# Patient Record
Sex: FEMALE | Race: ASIAN | Employment: OTHER | URBAN - METROPOLITAN AREA
[De-identification: names, ages, dates, MRNs, and addresses within clinical notes are randomized per-mention and may not be internally consistent; named-entity substitution may affect disease eponyms.]

---

## 2017-01-11 ENCOUNTER — HOSPITAL ENCOUNTER (INPATIENT)
Facility: HOSPITAL | Age: 77
LOS: 2 days | Discharge: HOME | DRG: 305 | End: 2017-01-13
Attending: EMERGENCY MEDICINE | Admitting: HOSPITALIST
Payer: MEDICAID

## 2017-01-11 DIAGNOSIS — I16.1 HYPERTENSIVE EMERGENCY: Primary | ICD-10-CM

## 2017-01-11 DIAGNOSIS — E87.1 HYPONATREMIA: ICD-10-CM

## 2017-01-11 LAB
ALBUMIN SERPL-MCNC: 3.8 G/DL (ref 3.5–4.8)
ALP LIVER SERPL-CCNC: 82 U/L (ref 55–142)
ALT SERPL-CCNC: 65 U/L (ref 14–54)
AST SERPL-CCNC: 58 U/L (ref 15–41)
ATRIAL RATE: 60 BPM
BASOPHILS # BLD AUTO: 0.04 X10(3) UL (ref 0–0.1)
BASOPHILS NFR BLD AUTO: 0.5 %
BILIRUB SERPL-MCNC: 0.7 MG/DL (ref 0.1–2)
BILIRUB UR QL STRIP.AUTO: NEGATIVE
BUN BLD-MCNC: 16 MG/DL (ref 8–20)
CALCIUM BLD-MCNC: 9.1 MG/DL (ref 8.3–10.3)
CHLORIDE: 87 MMOL/L (ref 101–111)
CLARITY UR REFRACT.AUTO: CLEAR
CO2: 25 MMOL/L (ref 22–32)
CREAT BLD-MCNC: 0.87 MG/DL (ref 0.55–1.02)
CREAT UR-SCNC: 23.4 MG/DL
D-DIMER: 0.39 UG/ML FEU (ref 0–0.49)
EOSINOPHIL # BLD AUTO: 0.05 X10(3) UL (ref 0–0.3)
EOSINOPHIL NFR BLD AUTO: 0.6 %
ERYTHROCYTE [DISTWIDTH] IN BLOOD BY AUTOMATED COUNT: 13.1 % (ref 11.5–16)
EST. AVERAGE GLUCOSE BLD GHB EST-MCNC: 163 MG/DL (ref 68–126)
EST. AVERAGE GLUCOSE BLD GHB EST-MCNC: 163 MG/DL (ref 68–126)
GLUCOSE BLD-MCNC: 135 MG/DL (ref 65–99)
GLUCOSE BLD-MCNC: 137 MG/DL (ref 70–99)
GLUCOSE BLD-MCNC: 141 MG/DL (ref 65–99)
GLUCOSE UR STRIP.AUTO-MCNC: NEGATIVE MG/DL
HBA1C MFR BLD HPLC: 7.3 % (ref ?–5.7)
HBA1C MFR BLD HPLC: 7.3 % (ref ?–5.7)
HCT VFR BLD AUTO: 38.4 % (ref 34–50)
HGB BLD-MCNC: 13.6 G/DL (ref 12–16)
IMMATURE GRANULOCYTE COUNT: 0.02 X10(3) UL (ref 0–1)
IMMATURE GRANULOCYTE RATIO %: 0.2 %
KETONES UR STRIP.AUTO-MCNC: NEGATIVE MG/DL
LYMPHOCYTES # BLD AUTO: 3.06 X10(3) UL (ref 0.9–4)
LYMPHOCYTES NFR BLD AUTO: 36 %
M PROTEIN MFR SERPL ELPH: 8.8 G/DL (ref 6.1–8.3)
MCH RBC QN AUTO: 26.9 PG (ref 27–33.2)
MCHC RBC AUTO-ENTMCNC: 35.4 G/DL (ref 31–37)
MCV RBC AUTO: 76 FL (ref 81–100)
MONOCYTES # BLD AUTO: 0.56 X10(3) UL (ref 0.1–0.6)
MONOCYTES NFR BLD AUTO: 6.6 %
NEUTROPHIL ABS PRELIM: 4.76 X10 (3) UL (ref 1.3–6.7)
NEUTROPHILS # BLD AUTO: 4.76 X10(3) UL (ref 1.3–6.7)
NEUTROPHILS NFR BLD AUTO: 56.1 %
NITRITE UR QL STRIP.AUTO: NEGATIVE
P AXIS: 42 DEGREES
P-R INTERVAL: 214 MS
PH UR STRIP.AUTO: 7 [PH] (ref 4.5–8)
PLATELET # BLD AUTO: 221 10(3)UL (ref 150–450)
POTASSIUM SERPL-SCNC: 4.3 MMOL/L (ref 3.6–5.1)
PROT UR STRIP.AUTO-MCNC: 100 MG/DL
Q-T INTERVAL: 422 MS
QRS DURATION: 102 MS
QTC CALCULATION (BEZET): 422 MS
R AXIS: -2 DEGREES
RBC # BLD AUTO: 5.05 X10(6)UL (ref 3.8–5.1)
RBC UR QL AUTO: NEGATIVE
RED CELL DISTRIBUTION WIDTH-SD: 35.4 FL (ref 35.1–46.3)
SODIUM SERPL-SCNC: 119 MMOL/L (ref 136–144)
SODIUM SERPL-SCNC: 32 MMOL/L
SP GR UR STRIP.AUTO: 1.01 (ref 1–1.03)
T AXIS: 5 DEGREES
TSI SER-ACNC: 3.41 MIU/ML (ref 0.35–5.5)
UROBILINOGEN UR STRIP.AUTO-MCNC: <2 MG/DL
VENTRICULAR RATE: 60 BPM
WBC # BLD AUTO: 8.5 X10(3) UL (ref 4–13)

## 2017-01-11 PROCEDURE — 99223 1ST HOSP IP/OBS HIGH 75: CPT | Performed by: HOSPITALIST

## 2017-01-11 RX ORDER — SODIUM CHLORIDE 9 MG/ML
INJECTION, SOLUTION INTRAVENOUS CONTINUOUS
Status: DISCONTINUED | OUTPATIENT
Start: 2017-01-11 | End: 2017-01-12

## 2017-01-11 RX ORDER — LOSARTAN POTASSIUM 100 MG/1
100 TABLET ORAL DAILY
Status: DISCONTINUED | OUTPATIENT
Start: 2017-01-12 | End: 2017-01-13

## 2017-01-11 RX ORDER — HYDRALAZINE HYDROCHLORIDE 50 MG/1
100 TABLET, FILM COATED ORAL 2 TIMES DAILY WITH MEALS
Status: DISCONTINUED | OUTPATIENT
Start: 2017-01-11 | End: 2017-01-13

## 2017-01-11 RX ORDER — SENNA AND DOCUSATE SODIUM 50; 8.6 MG/1; MG/1
2 TABLET, FILM COATED ORAL 2 TIMES DAILY
Status: DISCONTINUED | OUTPATIENT
Start: 2017-01-11 | End: 2017-01-12

## 2017-01-11 RX ORDER — DEXTROSE MONOHYDRATE 25 G/50ML
50 INJECTION, SOLUTION INTRAVENOUS
Status: DISCONTINUED | OUTPATIENT
Start: 2017-01-11 | End: 2017-01-11

## 2017-01-11 RX ORDER — ONDANSETRON 2 MG/ML
4 INJECTION INTRAMUSCULAR; INTRAVENOUS EVERY 6 HOURS PRN
Status: DISCONTINUED | OUTPATIENT
Start: 2017-01-11 | End: 2017-01-13

## 2017-01-11 RX ORDER — CARVEDILOL 12.5 MG/1
25 TABLET ORAL 2 TIMES DAILY WITH MEALS
Status: DISCONTINUED | OUTPATIENT
Start: 2017-01-12 | End: 2017-01-11

## 2017-01-11 RX ORDER — CLONIDINE HYDROCHLORIDE 0.2 MG/1
0.2 TABLET ORAL DAILY
Status: DISCONTINUED | OUTPATIENT
Start: 2017-01-11 | End: 2017-01-11

## 2017-01-11 RX ORDER — SODIUM CHLORIDE 9 MG/ML
1000 INJECTION, SOLUTION INTRAVENOUS CONTINUOUS
Status: DISCONTINUED | OUTPATIENT
Start: 2017-01-11 | End: 2017-01-11

## 2017-01-11 RX ORDER — HYDRALAZINE HYDROCHLORIDE 50 MG/1
50 TABLET, FILM COATED ORAL EVERY 8 HOURS SCHEDULED
Status: DISCONTINUED | OUTPATIENT
Start: 2017-01-11 | End: 2017-01-11

## 2017-01-11 RX ORDER — POLYETHYLENE GLYCOL 3350 17 G/17G
17 POWDER, FOR SOLUTION ORAL DAILY PRN
Status: DISCONTINUED | OUTPATIENT
Start: 2017-01-11 | End: 2017-01-12

## 2017-01-11 RX ORDER — LACTULOSE 10 G/15ML
20 SOLUTION ORAL 3 TIMES DAILY PRN
Status: DISCONTINUED | OUTPATIENT
Start: 2017-01-11 | End: 2017-01-13

## 2017-01-11 RX ORDER — ENOXAPARIN SODIUM 100 MG/ML
40 INJECTION SUBCUTANEOUS DAILY
Status: DISCONTINUED | OUTPATIENT
Start: 2017-01-11 | End: 2017-01-13

## 2017-01-11 RX ORDER — AMLODIPINE BESYLATE 5 MG/1
10 TABLET ORAL DAILY
Status: DISCONTINUED | OUTPATIENT
Start: 2017-01-12 | End: 2017-01-13

## 2017-01-11 RX ORDER — CARVEDILOL 12.5 MG/1
25 TABLET ORAL 2 TIMES DAILY WITH MEALS
Status: DISCONTINUED | OUTPATIENT
Start: 2017-01-11 | End: 2017-01-13

## 2017-01-11 RX ORDER — HYDRALAZINE HYDROCHLORIDE 20 MG/ML
10 INJECTION INTRAMUSCULAR; INTRAVENOUS EVERY 6 HOURS PRN
Status: DISCONTINUED | OUTPATIENT
Start: 2017-01-11 | End: 2017-01-13

## 2017-01-11 RX ORDER — ACETAMINOPHEN 325 MG/1
650 TABLET ORAL EVERY 6 HOURS PRN
Status: DISCONTINUED | OUTPATIENT
Start: 2017-01-11 | End: 2017-01-13

## 2017-01-11 RX ORDER — CLONIDINE HYDROCHLORIDE 0.2 MG/1
0.2 TABLET ORAL DAILY
Status: ON HOLD | COMMUNITY
End: 2017-01-13

## 2017-01-11 RX ORDER — CETIRIZINE HYDROCHLORIDE 10 MG/1
10 TABLET ORAL DAILY
Status: DISCONTINUED | OUTPATIENT
Start: 2017-01-12 | End: 2017-01-13

## 2017-01-11 RX ORDER — DEXTROSE MONOHYDRATE 25 G/50ML
50 INJECTION, SOLUTION INTRAVENOUS
Status: DISCONTINUED | OUTPATIENT
Start: 2017-01-11 | End: 2017-01-13

## 2017-01-11 RX ORDER — CLONIDINE HYDROCHLORIDE 0.2 MG/1
0.2 TABLET ORAL 2 TIMES DAILY
Status: DISCONTINUED | OUTPATIENT
Start: 2017-01-12 | End: 2017-01-12

## 2017-01-11 RX ORDER — HYDRALAZINE HYDROCHLORIDE 20 MG/ML
20 INJECTION INTRAMUSCULAR; INTRAVENOUS EVERY 6 HOURS PRN
Status: DISCONTINUED | OUTPATIENT
Start: 2017-01-11 | End: 2017-01-13

## 2017-01-11 RX ORDER — ENALAPRILAT 2.5 MG/2ML
0.62 INJECTION INTRAVENOUS EVERY 6 HOURS PRN
Status: DISCONTINUED | OUTPATIENT
Start: 2017-01-11 | End: 2017-01-13

## 2017-01-11 NOTE — ED PROVIDER NOTES
Patient Seen in: [facility] 6ne-a    History   Patient presents with:  Hypertension (cardiovascular)    Stated Complaint: HTN x 2 mo    HPI    Patient is a 64-year-old female presents emergency room for evaluation of elevated blood pressure.   Cat as otherwise stated in HPI.     Physical Exam     ED Triage Vitals   BP 01/11/17 1047 228/86 mmHg   Pulse 01/11/17 1047 60   Resp 01/11/17 1047 15   Temp 01/11/17 1047 98.2 °F (36.8 °C)   Temp src 01/11/17 1047 Temporal   SpO2 01/11/17 1047 96 %   O2 Device FREE T4 - Normal   CBC WITH DIFFERENTIAL WITH PLATELET    Narrative: The following orders were created for panel order CBC WITH DIFFERENTIAL WITH PLATELET.   Procedure                               Abnormality         Status                     -------- 1/11/2017 Yes    Acute nonintractable headache, unspecified headache type R51 6/27/2016 Yes    Hyperglycemia R73.9 Unknown Yes    Hyponatremia E87.1 Unknown Yes    Transaminitis R74.0 Unknown Yes

## 2017-01-11 NOTE — HISTORICAL OFFICE NOTE
Branda Kanner  : 1940  ACCOUNT:  [id number]  [phone]  PCP:    TODAY'S DATE: 2016  DICTATED BY:  Lorice Bamberger Frommelt, A.P.N.]    CHIEF COMPLAINT: [Hospital D/C.]    HPI:    [On 2016, Nick Cardona, a 77year old female, presented with no i Known Allergies    MEDICATIONS: Selected prescriptions see below    VITAL SIGNS: [B/P - 160/82 , Pulse - 72, Weight -  133, Height -   60 , BMI - 26.0 ]    CONS: well developed, well nourished. WEIGHT: BMI parameters reviewed and discussed.  HEAD/FACE: no t three times a day                        07/28/16 Senna-Docusate Sodium 8.6-50MG  2 tabs twice a day                         CARMEN Patterson - DD: 07/28/16 - DT: 08/06/16 - Job ID: [id number]

## 2017-01-11 NOTE — CONSULTS
[facility]  Report of Consultation    Sisi Younger Patient Status:  Emergency    1940 MRN [id number]   Location [address] Attending Cora Benavidez MD   Hosp Day # 0 PCP [facility]     Reason for Consultation: h 24, height 5' 5\" (1.651 m), weight 130 lb (58.968 kg), SpO2 95 %.   Temp (24hrs), Av.2 °F (36.8 °C), Min:98.2 °F (36.8 °C), Max:98.2 °F (36.8 °C)    Wt Readings from Last 3 Encounters:  17 : 130 lb (58.968 kg)  16 : 130 lb 11.7 oz (59.3 kg) supportive care. Thanks.      Sanket Brown MD  1/11/2017  2:35 PM

## 2017-01-11 NOTE — H&P
[facility]  History and Physical     Marvin Chavez Patient Status:  Emergency    1940 MRN [id number]   Location [address] [facility] Attending Ortencia Kanner, MD   Hosp Day # 0 [facility]     Chief Complaint: Alonzo Brandt Rfl: 6   carvedilol 25 MG Oral Tab Take 1 tablet (25 mg total) by mouth 2 (two) times daily with meals. Disp: 60 tablet Rfl: 6   cetirizine 10 MG Oral Tab Take 10 mg by mouth daily.  Disp:  Rfl:        Review of Systems:   A comprehensive 14 point review of with fluid restriction  3. F/u BMP  3. Transaminitis, elevated in past as well, likely fatty liver  4. Constipation, cont laxatives, add Miralax daily PRN  5.  DMII, diet controlled, hyperglycemia protocol      Quality:  · DVT Prophylaxis: lovenox  · CODE s

## 2017-01-11 NOTE — HISTORICAL OFFICE NOTE
Tricia [facility]  [phone]  : 1940  ACCOUNT: [id number]  PCP: 00  CARDIOLOGIST: Dr. Dennys Pham: [facility]  Admitted: 2016  Discharged: 2016    DISCHARGE SUMMARY    DISCHARGE DIAGNOSES:  1. Longstanding hypertension.   2.

## 2017-01-12 ENCOUNTER — APPOINTMENT (OUTPATIENT)
Dept: ULTRASOUND IMAGING | Facility: HOSPITAL | Age: 77
DRG: 305 | End: 2017-01-12
Attending: INTERNAL MEDICINE
Payer: MEDICAID

## 2017-01-12 ENCOUNTER — APPOINTMENT (OUTPATIENT)
Dept: CV DIAGNOSTICS | Facility: HOSPITAL | Age: 77
DRG: 305 | End: 2017-01-12
Attending: INTERNAL MEDICINE
Payer: MEDICAID

## 2017-01-12 ENCOUNTER — APPOINTMENT (OUTPATIENT)
Dept: ULTRASOUND IMAGING | Facility: HOSPITAL | Age: 77
DRG: 305 | End: 2017-01-12
Attending: NURSE PRACTITIONER
Payer: MEDICAID

## 2017-01-12 ENCOUNTER — PRIOR ORIGINAL RECORDS (OUTPATIENT)
Dept: OTHER | Age: 77
End: 2017-01-12

## 2017-01-12 LAB
ANA SCREEN: POSITIVE
BASOPHILS # BLD AUTO: 0.02 X10(3) UL (ref 0–0.1)
BASOPHILS NFR BLD AUTO: 0.3 %
BETA NATRIURETIC PEPTIDE: 125 PG/ML (ref 2–99)
BUN BLD-MCNC: 11 MG/DL (ref 8–20)
CALCIUM BLD-MCNC: 8 MG/DL (ref 8.3–10.3)
CHLORIDE: 98 MMOL/L (ref 101–111)
CHOLEST SMN-MCNC: 135 MG/DL (ref ?–200)
CK: 164 IU/L (ref 26–192)
CO2: 25 MMOL/L (ref 22–32)
CREAT BLD-MCNC: 0.69 MG/DL (ref 0.55–1.02)
EOSINOPHIL # BLD AUTO: 0.04 X10(3) UL (ref 0–0.3)
EOSINOPHIL NFR BLD AUTO: 0.5 %
ERYTHROCYTE [DISTWIDTH] IN BLOOD BY AUTOMATED COUNT: 13.3 % (ref 11.5–16)
GLUCOSE BLD-MCNC: 124 MG/DL (ref 65–99)
GLUCOSE BLD-MCNC: 135 MG/DL (ref 70–99)
GLUCOSE BLD-MCNC: 155 MG/DL (ref 65–99)
GLUCOSE BLD-MCNC: 182 MG/DL (ref 65–99)
GLUCOSE BLD-MCNC: 195 MG/DL (ref 65–99)
HAV IGM SER QL: 1.7 MG/DL (ref 1.7–3)
HCT VFR BLD AUTO: 38 % (ref 34–50)
HDLC SERPL-MCNC: 44 MG/DL (ref 45–?)
HDLC SERPL: 3.07 {RATIO} (ref ?–4.44)
HGB BLD-MCNC: 12.6 G/DL (ref 12–16)
IMMATURE GRANULOCYTE COUNT: 0.02 X10(3) UL (ref 0–1)
IMMATURE GRANULOCYTE RATIO %: 0.3 %
LDLC SERPL CALC-MCNC: 64 MG/DL (ref ?–130)
LYMPHOCYTES # BLD AUTO: 2.93 X10(3) UL (ref 0.9–4)
LYMPHOCYTES NFR BLD AUTO: 39 %
MCH RBC QN AUTO: 26.8 PG (ref 27–33.2)
MCHC RBC AUTO-ENTMCNC: 33.2 G/DL (ref 31–37)
MCV RBC AUTO: 80.7 FL (ref 81–100)
MONOCYTES # BLD AUTO: 0.51 X10(3) UL (ref 0.1–0.6)
MONOCYTES NFR BLD AUTO: 6.8 %
NEUTROPHIL ABS PRELIM: 3.99 X10 (3) UL (ref 1.3–6.7)
NEUTROPHILS # BLD AUTO: 3.99 X10(3) UL (ref 1.3–6.7)
NEUTROPHILS NFR BLD AUTO: 53.1 %
NONHDLC SERPL-MCNC: 91 MG/DL (ref ?–130)
PLATELET # BLD AUTO: 122 10(3)UL (ref 150–450)
POTASSIUM SERPL-SCNC: 3.4 MMOL/L (ref 3.6–5.1)
RBC # BLD AUTO: 4.71 X10(6)UL (ref 3.8–5.1)
RED CELL DISTRIBUTION WIDTH-SD: 38.7 FL (ref 35.1–46.3)
SODIUM SERPL-SCNC: 131 MMOL/L (ref 136–144)
TRIGLYCERIDES: 137 MG/DL (ref ?–150)
TROPONIN: <0.046 NG/ML (ref ?–0.05)
VLDL: 27 MG/DL (ref 5–40)
WBC # BLD AUTO: 7.5 X10(3) UL (ref 4–13)

## 2017-01-12 PROCEDURE — 76700 US EXAM ABDOM COMPLETE: CPT

## 2017-01-12 PROCEDURE — 93970 EXTREMITY STUDY: CPT

## 2017-01-12 PROCEDURE — 93306 TTE W/DOPPLER COMPLETE: CPT | Performed by: INTERNAL MEDICINE

## 2017-01-12 PROCEDURE — 93925 LOWER EXTREMITY STUDY: CPT

## 2017-01-12 PROCEDURE — 99233 SBSQ HOSP IP/OBS HIGH 50: CPT | Performed by: HOSPITALIST

## 2017-01-12 RX ORDER — POTASSIUM CHLORIDE 20 MEQ/1
40 TABLET, EXTENDED RELEASE ORAL EVERY 4 HOURS
Status: COMPLETED | OUTPATIENT
Start: 2017-01-12 | End: 2017-01-12

## 2017-01-12 RX ORDER — MAGNESIUM OXIDE 400 MG (241.3 MG MAGNESIUM) TABLET
400 TABLET ONCE
Status: COMPLETED | OUTPATIENT
Start: 2017-01-12 | End: 2017-01-12

## 2017-01-12 RX ORDER — POLYETHYLENE GLYCOL 3350 17 G/17G
17 POWDER, FOR SOLUTION ORAL 2 TIMES DAILY
Status: DISCONTINUED | OUTPATIENT
Start: 2017-01-12 | End: 2017-01-13

## 2017-01-12 RX ORDER — SENNA AND DOCUSATE SODIUM 50; 8.6 MG/1; MG/1
2 TABLET, FILM COATED ORAL
Status: DISCONTINUED | OUTPATIENT
Start: 2017-01-12 | End: 2017-01-13

## 2017-01-12 RX ORDER — CLONIDINE HYDROCHLORIDE 0.1 MG/1
0.1 TABLET ORAL 2 TIMES DAILY
Status: DISCONTINUED | OUTPATIENT
Start: 2017-01-12 | End: 2017-01-13

## 2017-01-12 RX ORDER — SODIUM CHLORIDE 9 MG/ML
INJECTION, SOLUTION INTRAVENOUS CONTINUOUS
Status: DISCONTINUED | OUTPATIENT
Start: 2017-01-12 | End: 2017-01-13

## 2017-01-12 NOTE — PLAN OF CARE
Assumed care of pt @ 0730. Pt hypertensive. Scheduled PO meds given as ordered. Pt became hypotensive while sitting in the chair an hour later. Pt vomited once as well. Pt complaining of constipation on admission; had a large BM at this time.  SBP stable a

## 2017-01-12 NOTE — PROGRESS NOTES
[facility]  Progress Note    Miguel A Bean Patient Status:  Inpatient    1940 MRN [id number]   [facility] 6NE-A Attending Kelley Cramer MD   Hosp Day # 1 PCP [facility]       Subjective:  No chest pain or shortness of breath. now with symptomatic hypotension with bp dropping to 70s this am after meds given.  Given some fluid and bp improved but will back clonidine  · Diastolic dysfunction- compensated by exam  · Mild to mod aortic regurg  · Uncontrolled dm- A1C 7.3  · Hyponatrem

## 2017-01-12 NOTE — PLAN OF CARE
Pt arrived from ED at 1700. Non-english speaking. Family at bedside. Pt on Cardene gtt. A&O, complains of mild headache. Blood pressure under control at this time, <150. PO anti-HTN meds given as ordered.   Family reports pt has been having chills off and

## 2017-01-12 NOTE — DIETARY NOTE
NUTRITION INITIAL ASSESSMENT    Pt is at moderate nutrition risk. Pt does not meet malnutrition criteria.     NUTRITION DIAGNOSIS/PROBLEM:    Inadequate oral intake related to inability to consume sufficient energy as evidenced by 15lb wt loss in 8 months, meet at least 75% patient nutrition prescription  2. At least 75% intake of oral supplements  3. No signs of skin breakdown  4.  Maintain lean body mass    MEDICATIONS:  Noted    LABS:  HgbA1c- 7.3, Na 131    FOLLOW-UP DATE: 1/17/17    Aram Vaughan MS, RD, LDN

## 2017-01-12 NOTE — PAYOR COMM NOTE
Attending Physician: Jono Sexton MD    Review Type: ADMISSION   Reviewer: Landon Anthony       Date: January 12, 2017 - 10:25 AM  Payor: Margy Keller Number: [id number].   Admit date: 1/11/2017 11:08 AM   Admitted from Emergency Dept.: yes 10 mg Oral Toyin Bosch, MILKA      CloNIDine HCl (CATAPRES) tab 0.2 mg     Date Action Dose Route User    1/12/2017 0839 Given 0.2 mg Oral Toyin Bosch, RN      Enoxaparin Sodium (LOVENOX) 40 MG/0.4ML injection 40 mg     Date Action Dose Route User NaCl infusion     Date Action Dose Route User    1/11/2017 1225 New Bag 1000 mL Intravenous Ruma Zarate, RN      0.9%  NaCl infusion     Date Action Dose Route User    1/11/2017 1708 New Bag (none) Intravenous Derrick Martinez, MILKA          RESULTS LAST (*)     MCH 26.9 (*)     All other components within normal limits   CBC W/ DIFFERENTIAL - Abnormal; Notable for the following:     .0 (*)     MCV 80.7 (*)     MCH 26.8 (*)     All other components within normal limits   TSH W REFLEX TO FREE T4 - No following  2. Hyponatremia  1. TSH pending  2. Cont NS with fluid restriction  3. F/u BMP  3. Transaminitis, elevated in past as well, likely fatty liver  4.  Constipation, cont laxatives, add Miralax daily PRN  DMII, diet controlled, hyperglycemia protocol

## 2017-01-12 NOTE — PROGRESS NOTES
[facility]  Progress Note     Dakotah Velasquez Patient Status:  Inpatient    1940 MRN [id number]   [facility] 6NE-A Attending Jono Sexton MD   Hosp Day # 1 [facility]     Chief Complaint: Elevated BP    S: No acute events cetirizine  10 mg Oral Daily   • Senna-Docusate Sodium  2 tablet Oral BID   • enoxaparin  40 mg Subcutaneous Daily   • insulin aspart  2-10 Units Subcutaneous TID CC and HS       ASSESSMENT / PLAN:     1. HTN urgency  1.  Pt with h/o difficult to control HT

## 2017-01-12 NOTE — CONSULTS
[facility]                       Gastroenterology [address] [facility] Gastroenterology    Flynn Ramirez Patient Status:  Inpatient    1940 MRN [id number]   [facility] 8NE-A Attending Bello Whitney MD   Hosp Day # 1 [facility] allergies      PSHx:     Past Surgical History    REMOVAL GALLBLADDER      TOTAL ABDOM HYSTERECTOMY       Medications:   [COMPLETED] magnesium oxide (MAG-OX) tab 400 mg 400 mg Oral Once   CloNIDine HCl (CATAPRES) tab 0.1 mg 0.1 mg Oral BID   0.9%  NaCl inf Min PRN   Or      Glucose-Vitamin C (DEX-4) 4-0.006 G chewable tab 8 tablet 8 tablet Oral Q15 Min PRN   insulin aspart (NOVOLOG) 100 UNIT/ML flexpen 2-10 Units 2-10 Units Subcutaneous TID CC and HS   PEG 3350 (MIRALAX) powder packet 17 g 17 g Oral Daily NM arthritis, myalgias, arthralgias            Genitourinary:  The patient reports no history of recurrent urinary tract infections, hematuria, dysuria, or nephrolithiasis           Psychiatric: The patient reports no history of depression, anxiety, suicidal Lab  01/12/17   0458   RBC  4.71   HGB  12.6   HCT  38.0   MCV  80.7*   MCH  26.8*   MCHC  33.2   RDW  13.3   NEPRELIM  3.99   WBC  7.5   PLT  122.0*       Recent Labs   Lab  01/11/17   1130   ALT  65*   AST  58*       Imaging:     PROCEDURE:  US ABDOMEN rescue agent and obtain autoimmune work-up to assess for autoimmune hepatitis. Recommendations:     1. US abdomen completed per earlier orders   2. MELO, ASMA, alpha-1 antitrypsin, AMA now   3.  Start Miralax 17 gm PO BID and can use Senna as a rescue ag includes images of the liver, gallbladder, common bile duct, pancreas, spleen, kidneys, IVC, and aorta.     FINDINGS:    LIVER:  Uniform echotexture. BILIARY:  Surgically absent gallbladder. No intrahepatic biliary dilatation. CBD is measured at 0.7 cm.

## 2017-01-12 NOTE — OCCUPATIONAL THERAPY NOTE
Attempted to see pt this AM, per RN pt not approp since pt's BP is not controlled at this time, OT will follow up as approp.

## 2017-01-13 VITALS
RESPIRATION RATE: 20 BRPM | BODY MASS INDEX: 21.66 KG/M2 | DIASTOLIC BLOOD PRESSURE: 50 MMHG | WEIGHT: 130 LBS | HEART RATE: 66 BPM | SYSTOLIC BLOOD PRESSURE: 146 MMHG | HEIGHT: 65 IN | OXYGEN SATURATION: 95 % | TEMPERATURE: 98 F

## 2017-01-13 LAB
GLUCOSE BLD-MCNC: 103 MG/DL (ref 65–99)
GLUCOSE BLD-MCNC: 116 MG/DL (ref 65–99)
GLUCOSE BLD-MCNC: 159 MG/DL (ref 65–99)
HAV IGM SER QL: 1.9 MG/DL (ref 1.7–3)
PLATELET # BLD AUTO: 194 10(3)UL (ref 150–450)
POTASSIUM SERPL-SCNC: 4.5 MMOL/L (ref 3.6–5.1)

## 2017-01-13 PROCEDURE — 99239 HOSP IP/OBS DSCHRG MGMT >30: CPT | Performed by: HOSPITALIST

## 2017-01-13 RX ORDER — CLONIDINE HYDROCHLORIDE 0.1 MG/1
0.1 TABLET ORAL 2 TIMES DAILY
Qty: 60 TABLET | Refills: 3 | Status: SHIPPED | OUTPATIENT
Start: 2017-01-13 | End: 2017-05-11

## 2017-01-13 RX ORDER — SENNA AND DOCUSATE SODIUM 50; 8.6 MG/1; MG/1
2 TABLET, FILM COATED ORAL
Qty: 20 TABLET | Refills: 0 | Status: SHIPPED | OUTPATIENT
Start: 2017-01-13 | End: 2017-05-11

## 2017-01-13 RX ORDER — LOSARTAN POTASSIUM 100 MG/1
100 TABLET ORAL
Qty: 30 TABLET | Refills: 3 | Status: SHIPPED | OUTPATIENT
Start: 2017-01-13 | End: 2017-01-13

## 2017-01-13 RX ORDER — POLYETHYLENE GLYCOL 3350 17 G/17G
17 POWDER, FOR SOLUTION ORAL 2 TIMES DAILY
Qty: 7 EACH | Refills: 0 | Status: SHIPPED | OUTPATIENT
Start: 2017-01-13 | End: 2017-01-20

## 2017-01-13 RX ORDER — CLONIDINE HYDROCHLORIDE 0.1 MG/1
0.1 TABLET ORAL 2 TIMES DAILY
Qty: 60 TABLET | Refills: 3 | Status: SHIPPED | OUTPATIENT
Start: 2017-01-13 | End: 2017-01-13

## 2017-01-13 RX ORDER — AMLODIPINE BESYLATE 10 MG/1
10 TABLET ORAL
Qty: 30 TABLET | Refills: 3 | Status: SHIPPED | OUTPATIENT
Start: 2017-01-13 | End: 2017-05-11

## 2017-01-13 RX ORDER — LOSARTAN POTASSIUM 100 MG/1
100 TABLET ORAL
Qty: 30 TABLET | Refills: 3 | Status: SHIPPED | OUTPATIENT
Start: 2017-01-13 | End: 2017-05-11

## 2017-01-13 RX ORDER — AMLODIPINE BESYLATE 10 MG/1
10 TABLET ORAL
Qty: 30 TABLET | Refills: 3 | Status: SHIPPED | OUTPATIENT
Start: 2017-01-13 | End: 2017-01-13

## 2017-01-13 RX ORDER — HYDRALAZINE HYDROCHLORIDE 100 MG/1
100 TABLET, FILM COATED ORAL 2 TIMES DAILY WITH MEALS
Qty: 60 TABLET | Refills: 3 | Status: SHIPPED | OUTPATIENT
Start: 2017-01-13 | End: 2017-01-13

## 2017-01-13 RX ORDER — HYDRALAZINE HYDROCHLORIDE 100 MG/1
100 TABLET, FILM COATED ORAL 2 TIMES DAILY WITH MEALS
Qty: 60 TABLET | Refills: 3 | Status: SHIPPED | OUTPATIENT
Start: 2017-01-13 | End: 2017-05-11

## 2017-01-13 NOTE — PHYSICAL THERAPY NOTE
PHYSICAL THERAPY EVALUATION - INPATIENT     Room Number: 8246/4825-S  Evaluation Date: 1/13/2017  Type of Evaluation: Initial  Physician Order: PT Eval and Treat    Presenting Problem: headaches and hypertensive urgency  Reason for Therapy: Mobility Dy ACTIVITY TOLERANCE  Room air  No shortness of breath    AM-PAC '6-Clicks' INPATIENT SHORT FORM - BASIC MOBILITY  How much difficulty does the patient currently have. ..  -   Turning over in bed (including adjusting bedclothes, sheets and chair;Needs met;Call light within reach;RN aware of session/findings; All patient questions and concerns addressed; Family present    ASSESSMENT     Patient is a 69year old female admitted 1/11/2017 for hypertensive urgency and headache.    In this PT evalua

## 2017-01-13 NOTE — PROGRESS NOTES
[facility]  Progress Note     Rupert Going Patient Status:  Inpatient    1940 MRN [id number]   [facility] 6NE-A Attending Veto Mayer MD   Hosp Day # 2 PCP [facility]     Chief Complaint: Elevated BP    S: No acute events 25 mg Oral BID with meals   • cetirizine  10 mg Oral Daily   • enoxaparin  40 mg Subcutaneous Daily   • insulin aspart  2-10 Units Subcutaneous TID CC and HS       ASSESSMENT / PLAN:     1. HTN urgency  1. Pt with h/o difficult to control HTN  2.  Cont channing

## 2017-01-13 NOTE — OCCUPATIONAL THERAPY NOTE
OCCUPATIONAL THERAPY QUICK EVALUATION - INPATIENT    Room Number: 3145/1175-O  Evaluation Date: 1/13/2017     Type of Evaluation: Quick Eval  Presenting Problem: Hypertensive Emergency    Physician Order: IP Consult to Occupational Therapy  Reason for Ther SITUATION  Type of Home: House  Home Layout: Two level  Lives With: Daughter    Toilet and Equipment: Standard height toilet  Shower/Tub and Equipment: Tub-shower combo  Other Equipment: None    Occupation/Status: n/a  Hand Dominance: Right  Drives: No  Pa and assisted with Interpretation of Marta dialect.   Pt education on Role of OT, POC, D/C plan, Energy Conservation Techniques, Home Safety/Fall Preventio.  Home Safety/Fall Prevention discussion continued with patient asking questions related to home/wor

## 2017-01-13 NOTE — PROGRESS NOTES
[facility]  Cardiology Progress Note    Jonny Martinez Patient Status:  Inpatient    1940 MRN [id number]   [facility] 8NE-A Attending Akbar Low MD   Hosp Day # 2 PCP [facility]     Subjective:  Feels good today.  No dizziness normally collapsible and normal in     size. Impressions:  This study is compared with previous dated 06-29-16: Compared  to the prior study, there has been no significant interval change.       Tele:      Labs:  Recent Labs   Lab  01/11/17   1130  01/12 benefit from ASA given PAD. · Check orthostatics. Andrae Patel NP   1/13/2017  10:13 AM    Patient seen and examined. Note reviewed and labs reviewed. Agree. Up in halls with PT. Platelet ct up to 966 today. BP stable.  [id number] Garima Callahan for d/c home, to see

## 2017-01-13 NOTE — PLAN OF CARE
CARDIOVASCULAR - ADULT    • Maintains optimal cardiac output and hemodynamic stability Progressing    • Absence of cardiac arrhythmias or at baseline Progressing        RESPIRATORY - ADULT    • Achieves optimal ventilation and oxygenation Progressing

## 2017-01-13 NOTE — CM/SW NOTE
Elaine Alves with [facility] [phone] called to advise that they have accepted case. SW faxed avs to her at [phone].

## 2017-01-14 LAB
ALPHA-1-ANTITRYPSIN: 136 MG/DL
F-ACTIN (SMOOTH MUSCLE) AB: 10 UNITS
MITOCHONDRIAL M2 AB, IGG: 2.8 UNITS

## 2017-01-14 NOTE — PLAN OF CARE
NURSING DISCHARGE NOTE    Discharged home via private car. Accompanied by family. Belongings sent home with family and patient. Tele off. IV removed. All discharge instructions reviewed with the patient and family at the bedside.  All questions an

## 2017-01-14 NOTE — DISCHARGE SUMMARY
[facility]  DISCHARGE SUMMARY     Robert Morgan Patient Status:  Inpatient    1940 MRN [id number]   [facility] 8NE-A Attending No att. providers found   Hosp Day # 2 PCP [facility]     Date of Admission: 2017  Date of Silver Fuchs pressure. Her blood pressure medications were adjusted per cardiology. She did become hypotensive as all medications were given at one time and therefore timing of the medications were adjusted.   Cardiology recommended the patient take Coreg, hydralazine (two) times daily.     Stop taking on:  1/20/2017   Quantity:  7 each   Refills:  0         CHANGE how you take these medications       Instructions Prescription details    CloNIDine HCl 0.1 MG Tabs   Last time this was given:  0.1 mg on 1/13/2017  9:49 AM - AmLODIPine Besylate 10 MG Tabs  - CloNIDine HCl 0.1 MG Tabs  - HydrALAZINE HCl 100 MG Tabs  - Losartan Potassium 100 MG Tabs  - MetFORMIN HCl 500 MG Tabs  - PEG 3350 Pack  - Senna-Docusate Sodium 8.6-50 MG Tabs          Follow-up appointment:   Saadia Carey,

## 2017-01-17 LAB — HEPATITIS E IGM: NEGATIVE

## 2017-01-20 LAB — HEPATITIS E IGG: POSITIVE

## 2017-02-01 LAB
BUN: 11 MG/DL
CALCIUM: 8 MG/DL
CHLORIDE: 98 MEQ/L
CHOLESTEROL, TOTAL: 135 MG/DL
CREATININE, SERUM: 0.69 MG/DL
GLUCOSE: 135 MG/DL
HDL CHOLESTEROL: 44 MG/DL
HEMATOCRIT: 38 %
HEMOGLOBIN A1C: 7.3 %
HEMOGLOBIN: 12.6 G/DL
LDL CHOLESTEROL: 64 MG/DL
PLATELETS: 122 K/UL
POTASSIUM, SERUM: 3.4 MEQ/L
RED BLOOD COUNT: 4.71 X 10-6/U
SODIUM: 131 MEQ/L
TRIGLYCERIDES: 137 MG/DL
WHITE BLOOD COUNT: 7.5 X 10-3/U

## 2017-05-11 ENCOUNTER — OFFICE VISIT (OUTPATIENT)
Dept: INTERNAL MEDICINE CLINIC | Facility: CLINIC | Age: 77
End: 2017-05-11

## 2017-05-11 VITALS
OXYGEN SATURATION: 97 % | BODY MASS INDEX: 26.5 KG/M2 | TEMPERATURE: 98 F | DIASTOLIC BLOOD PRESSURE: 68 MMHG | SYSTOLIC BLOOD PRESSURE: 110 MMHG | WEIGHT: 135 LBS | HEART RATE: 62 BPM | HEIGHT: 60 IN | RESPIRATION RATE: 12 BRPM

## 2017-05-11 DIAGNOSIS — M19.90 CHRONIC OSTEOARTHRITIS: ICD-10-CM

## 2017-05-11 DIAGNOSIS — R74.01 TRANSAMINITIS: ICD-10-CM

## 2017-05-11 DIAGNOSIS — I10 ESSENTIAL HYPERTENSION: Primary | ICD-10-CM

## 2017-05-11 DIAGNOSIS — E87.1 HYPONATREMIA: ICD-10-CM

## 2017-05-11 DIAGNOSIS — M79.89 SWELLING OF LOWER EXTREMITY: ICD-10-CM

## 2017-05-11 DIAGNOSIS — E11.65 CONTROLLED TYPE 2 DIABETES MELLITUS WITH HYPERGLYCEMIA, WITHOUT LONG-TERM CURRENT USE OF INSULIN (HCC): ICD-10-CM

## 2017-05-11 PROCEDURE — 99214 OFFICE O/P EST MOD 30 MIN: CPT | Performed by: INTERNAL MEDICINE

## 2017-05-11 RX ORDER — LOSARTAN POTASSIUM 100 MG/1
100 TABLET ORAL
Qty: 90 TABLET | Refills: 1 | Status: SHIPPED | OUTPATIENT
Start: 2017-05-11 | End: 2017-10-03

## 2017-05-11 RX ORDER — CLONIDINE HYDROCHLORIDE 0.1 MG/1
0.1 TABLET ORAL 2 TIMES DAILY
Qty: 180 TABLET | Refills: 1 | Status: SHIPPED | OUTPATIENT
Start: 2017-05-11 | End: 2017-10-10

## 2017-05-11 RX ORDER — HYDRALAZINE HYDROCHLORIDE 100 MG/1
100 TABLET, FILM COATED ORAL 2 TIMES DAILY WITH MEALS
Qty: 180 TABLET | Refills: 1 | Status: SHIPPED | OUTPATIENT
Start: 2017-05-11 | End: 2017-10-03

## 2017-05-11 RX ORDER — AMLODIPINE BESYLATE 10 MG/1
10 TABLET ORAL
Qty: 90 TABLET | Refills: 1 | Status: SHIPPED | OUTPATIENT
Start: 2017-05-11 | End: 2017-10-03

## 2017-05-11 RX ORDER — CARVEDILOL 25 MG/1
25 TABLET ORAL 2 TIMES DAILY WITH MEALS
Qty: 180 TABLET | Refills: 1 | Status: SHIPPED | OUTPATIENT
Start: 2017-05-11 | End: 2017-12-28

## 2017-05-11 RX ORDER — FUROSEMIDE 20 MG/1
20 TABLET ORAL 2 TIMES DAILY
Qty: 30 TABLET | Refills: 0 | Status: SHIPPED | OUTPATIENT
Start: 2017-05-11 | End: 2017-06-11

## 2017-05-11 RX ORDER — SENNA AND DOCUSATE SODIUM 50; 8.6 MG/1; MG/1
2 TABLET, FILM COATED ORAL
Qty: 30 TABLET | Refills: 3 | Status: SHIPPED | OUTPATIENT
Start: 2017-05-11 | End: 2018-03-13

## 2017-05-11 RX ORDER — FUROSEMIDE 20 MG/1
20 TABLET ORAL DAILY
Qty: 30 TABLET | Refills: 0 | COMMUNITY
Start: 2017-05-11 | End: 2017-07-06 | Stop reason: DRUGHIGH

## 2017-05-11 NOTE — PROGRESS NOTES
Elfego Cuellar is a 69year old female. HPI:   Patient presents with:  Establish Care  HTN  Patient presents to establish care. Daughter in room to translate. Her previous  PCP, Dr. Jacek Lebron, no longer is in network.     Patient was hospitalized times daily. , Disp: , Rfl: 5  •  Meloxicam 15 MG Oral Tab, Take 1 tablet by mouth daily. , Disp: , Rfl: 0  •  Oxybutynin Chloride 5 MG Oral Tab, Take 1 tablet by mouth daily. , Disp: , Rfl: 4  •  AmLODIPine Besylate 10 MG Oral Tab, Take 1 tablet (10 mg total nourished,in no apparent distress  SKIN: no rashes,no suspicious lesions  HEENT: atraumatic, PERRLA, EOMI, normal lid and conjunctiva  NECK: supple, no jvd, no thyromegaly  LUNGS: clear to auscultation bilaterally, no wheezing/rubs  CARDIO: RRR without mur lana. Will obtain records from previous PCP, Dr. Nika Lugo. Patient Care Team:  Dana Dockery MD as PCP - General (Internal Medicine)  The patient indicates understanding of these issues and agrees to the plan.   The patient is asked to re

## 2017-05-11 NOTE — PATIENT INSTRUCTIONS
- We are lowering the dose of the water pill. It is now 20 mg daily, rather than 40 mg daily. - Continue all other current medications  - Follow up with me in 3 weeks    It was a pleasure seeing you in the clinic today.   Thank you for choosing the [facility]

## 2017-05-12 PROBLEM — E11.9 DIABETES MELLITUS TYPE II, CONTROLLED (HCC): Status: ACTIVE | Noted: 2017-05-12

## 2017-05-12 PROBLEM — M19.90 CHRONIC OSTEOARTHRITIS: Status: ACTIVE | Noted: 2017-05-12

## 2017-05-12 RX ORDER — OXYBUTYNIN CHLORIDE 5 MG/1
1 TABLET ORAL DAILY
Refills: 4 | COMMUNITY
Start: 2017-04-14 | End: 2018-07-12

## 2017-05-12 RX ORDER — CALCIUM CITRATE/VITAMIN D3 200MG-6.25
1 TABLET ORAL 2 TIMES DAILY
Refills: 5 | COMMUNITY
Start: 2017-04-15 | End: 2018-03-13 | Stop reason: CLARIF

## 2017-05-12 RX ORDER — GLUCOSAM/CHON-MSM1/C/MANG/BOSW 500-416.6
1 TABLET ORAL 2 TIMES DAILY
Refills: 5 | COMMUNITY
Start: 2017-04-15 | End: 2018-03-13 | Stop reason: CLARIF

## 2017-05-12 RX ORDER — MELOXICAM 15 MG/1
1 TABLET ORAL DAILY
Refills: 0 | COMMUNITY
Start: 2017-03-06 | End: 2018-05-25 | Stop reason: DRUGHIGH

## 2017-06-13 RX ORDER — FUROSEMIDE 20 MG/1
TABLET ORAL
Qty: 30 TABLET | Refills: 0 | Status: SHIPPED | OUTPATIENT
Start: 2017-06-13 | End: 2017-07-06 | Stop reason: DRUGHIGH

## 2017-07-06 ENCOUNTER — LAB ENCOUNTER (OUTPATIENT)
Dept: LAB | Age: 77
End: 2017-07-06
Attending: INTERNAL MEDICINE
Payer: COMMERCIAL

## 2017-07-06 ENCOUNTER — OFFICE VISIT (OUTPATIENT)
Dept: INTERNAL MEDICINE CLINIC | Facility: CLINIC | Age: 77
End: 2017-07-06

## 2017-07-06 VITALS
RESPIRATION RATE: 16 BRPM | HEART RATE: 63 BPM | DIASTOLIC BLOOD PRESSURE: 60 MMHG | BODY MASS INDEX: 26.11 KG/M2 | TEMPERATURE: 99 F | HEIGHT: 60 IN | SYSTOLIC BLOOD PRESSURE: 120 MMHG | OXYGEN SATURATION: 99 % | WEIGHT: 133 LBS

## 2017-07-06 DIAGNOSIS — R74.01 TRANSAMINITIS: ICD-10-CM

## 2017-07-06 DIAGNOSIS — R60.0 PEDAL EDEMA: ICD-10-CM

## 2017-07-06 DIAGNOSIS — E11.65 CONTROLLED TYPE 2 DIABETES MELLITUS WITH HYPERGLYCEMIA, WITHOUT LONG-TERM CURRENT USE OF INSULIN (HCC): ICD-10-CM

## 2017-07-06 DIAGNOSIS — J06.9 ACUTE UPPER RESPIRATORY INFECTION: Primary | ICD-10-CM

## 2017-07-06 DIAGNOSIS — R53.83 FATIGUE, UNSPECIFIED TYPE: ICD-10-CM

## 2017-07-06 DIAGNOSIS — I10 ESSENTIAL HYPERTENSION: ICD-10-CM

## 2017-07-06 LAB
25-HYDROXYVITAMIN D (TOTAL): 10.5 NG/ML (ref 30–100)
ALBUMIN SERPL-MCNC: 3.7 G/DL (ref 3.5–4.8)
ALP LIVER SERPL-CCNC: 67 U/L (ref 55–142)
ALT SERPL-CCNC: 39 U/L (ref 14–54)
AST SERPL-CCNC: 24 U/L (ref 15–41)
BASOPHILS # BLD AUTO: 0.05 X10(3) UL (ref 0–0.1)
BASOPHILS NFR BLD AUTO: 0.6 %
BILIRUB SERPL-MCNC: 0.3 MG/DL (ref 0.1–2)
BUN BLD-MCNC: 21 MG/DL (ref 8–20)
CALCIUM BLD-MCNC: 9 MG/DL (ref 8.3–10.3)
CHLORIDE: 97 MMOL/L (ref 101–111)
CO2: 25 MMOL/L (ref 22–32)
CREAT BLD-MCNC: 1.02 MG/DL (ref 0.55–1.02)
EOSINOPHIL # BLD AUTO: 0.11 X10(3) UL (ref 0–0.3)
EOSINOPHIL NFR BLD AUTO: 1.4 %
ERYTHROCYTE [DISTWIDTH] IN BLOOD BY AUTOMATED COUNT: 12.2 % (ref 11.5–16)
GLUCOSE BLD-MCNC: 108 MG/DL (ref 70–99)
HCT VFR BLD AUTO: 33.1 % (ref 34–50)
HGB BLD-MCNC: 10.9 G/DL (ref 12–16)
IMMATURE GRANULOCYTE COUNT: 0.01 X10(3) UL (ref 0–1)
IMMATURE GRANULOCYTE RATIO %: 0.1 %
LYMPHOCYTES # BLD AUTO: 2.26 X10(3) UL (ref 0.9–4)
LYMPHOCYTES NFR BLD AUTO: 29.1 %
M PROTEIN MFR SERPL ELPH: 8.3 G/DL (ref 6.1–8.3)
MCH RBC QN AUTO: 26.9 PG (ref 27–33.2)
MCHC RBC AUTO-ENTMCNC: 32.9 G/DL (ref 31–37)
MCV RBC AUTO: 81.7 FL (ref 81–100)
MONOCYTES # BLD AUTO: 0.68 X10(3) UL (ref 0.1–0.6)
MONOCYTES NFR BLD AUTO: 8.8 %
NEUTROPHIL ABS PRELIM: 4.66 X10 (3) UL (ref 1.3–6.7)
NEUTROPHILS # BLD AUTO: 4.66 X10(3) UL (ref 1.3–6.7)
NEUTROPHILS NFR BLD AUTO: 60 %
PLATELET # BLD AUTO: 255 10(3)UL (ref 150–450)
POTASSIUM SERPL-SCNC: 4.1 MMOL/L (ref 3.6–5.1)
RBC # BLD AUTO: 4.05 X10(6)UL (ref 3.8–5.1)
RED CELL DISTRIBUTION WIDTH-SD: 36.4 FL (ref 35.1–46.3)
SODIUM SERPL-SCNC: 129 MMOL/L (ref 136–144)
WBC # BLD AUTO: 7.8 X10(3) UL (ref 4–13)

## 2017-07-06 PROCEDURE — 99214 OFFICE O/P EST MOD 30 MIN: CPT | Performed by: INTERNAL MEDICINE

## 2017-07-06 PROCEDURE — 82306 VITAMIN D 25 HYDROXY: CPT | Performed by: INTERNAL MEDICINE

## 2017-07-06 PROCEDURE — 80053 COMPREHEN METABOLIC PANEL: CPT | Performed by: INTERNAL MEDICINE

## 2017-07-06 PROCEDURE — 36415 COLL VENOUS BLD VENIPUNCTURE: CPT | Performed by: INTERNAL MEDICINE

## 2017-07-06 PROCEDURE — 85025 COMPLETE CBC W/AUTO DIFF WBC: CPT | Performed by: INTERNAL MEDICINE

## 2017-07-06 RX ORDER — AMOXICILLIN AND CLAVULANATE POTASSIUM 875; 125 MG/1; MG/1
1 TABLET, FILM COATED ORAL 2 TIMES DAILY
Qty: 14 TABLET | Refills: 0 | Status: SHIPPED | OUTPATIENT
Start: 2017-07-06 | End: 2017-07-13

## 2017-07-06 RX ORDER — AZELASTINE HYDROCHLORIDE 0.5 MG/ML
1 SOLUTION/ DROPS OPHTHALMIC DAILY
Qty: 6 ML | Refills: 1 | Status: SHIPPED | OUTPATIENT
Start: 2017-07-06 | End: 2019-04-24 | Stop reason: ALTCHOICE

## 2017-07-06 RX ORDER — FUROSEMIDE 40 MG/1
TABLET ORAL
COMMUNITY
Start: 2017-04-21 | End: 2017-07-19 | Stop reason: DRUGHIGH

## 2017-07-06 NOTE — PATIENT INSTRUCTIONS
- Start antibiotics (Augmentin). Take 1 tablet twice a day with food. - Start antihistamine eye drops. Use once or twice a day as needed. - Also use steroid nasal sprays such as fluticasone, mometasone, Flonase, Nasocort, Nasonex, Rhinocort.   - Take 2

## 2017-07-06 NOTE — PROGRESS NOTES
Jacek Vick is a 69year old female. HPI:   Patient presents with:  Cough: with congestion, for one week, itchy eyes, post nasal drip   Patient presents with acute upper respiratory complaints. Productive cough, clear phlegm/mucus.   Going on for 1 times daily. , Disp: 180 tablet, Rfl: 1  •  HydrALAZINE HCl 100 MG Oral Tab, Take 1 tablet (100 mg total) by mouth 2 (two) times daily with meals. , Disp: 180 tablet, Rfl: 1  •  losartan 100 MG Oral Tab, Take 1 tablet (100 mg total) by mouth daily before jacqueline murmurs. No clubbing, cyanosis. Bilateral pedal edema. GI: soft non tender nondistended no hepatosplenomegaly, bowel sounds throughout  PSYCH: pleasant, appropriate mood and affect  ASSESSMENT AND PLAN:   1.   Fatigue  Non-specific symptoms for past darby

## 2017-07-07 RX ORDER — ERGOCALCIFEROL 1.25 MG/1
50000 CAPSULE ORAL WEEKLY
Qty: 12 CAPSULE | Refills: 0 | Status: SHIPPED | OUTPATIENT
Start: 2017-07-07 | End: 2017-08-06

## 2017-07-19 RX ORDER — FUROSEMIDE 20 MG/1
TABLET ORAL
Qty: 30 TABLET | Refills: 1 | Status: SHIPPED | OUTPATIENT
Start: 2017-07-19 | End: 2017-10-02

## 2017-10-03 RX ORDER — FUROSEMIDE 20 MG/1
TABLET ORAL
Qty: 30 TABLET | Refills: 0 | Status: SHIPPED | OUTPATIENT
Start: 2017-10-03 | End: 2017-10-30

## 2017-10-05 RX ORDER — LOSARTAN POTASSIUM 100 MG/1
TABLET ORAL
Qty: 90 TABLET | Refills: 1 | Status: SHIPPED | OUTPATIENT
Start: 2017-10-05 | End: 2018-04-11

## 2017-10-05 RX ORDER — AMLODIPINE BESYLATE 10 MG/1
TABLET ORAL
Qty: 90 TABLET | Refills: 1 | Status: SHIPPED | OUTPATIENT
Start: 2017-10-05 | End: 2018-03-13 | Stop reason: DRUGHIGH

## 2017-10-05 RX ORDER — HYDRALAZINE HYDROCHLORIDE 100 MG/1
TABLET, FILM COATED ORAL
Qty: 180 TABLET | Refills: 1 | Status: SHIPPED | OUTPATIENT
Start: 2017-10-05 | End: 2018-04-11

## 2017-10-10 RX ORDER — CLONIDINE HYDROCHLORIDE 0.1 MG/1
0.1 TABLET ORAL 2 TIMES DAILY
Qty: 180 TABLET | Refills: 1 | Status: SHIPPED | OUTPATIENT
Start: 2017-10-10 | End: 2018-04-11

## 2017-10-20 ENCOUNTER — LAB ENCOUNTER (OUTPATIENT)
Dept: LAB | Age: 77
End: 2017-10-20
Attending: INTERNAL MEDICINE
Payer: MEDICAID

## 2017-10-20 ENCOUNTER — OFFICE VISIT (OUTPATIENT)
Dept: INTERNAL MEDICINE CLINIC | Facility: CLINIC | Age: 77
End: 2017-10-20

## 2017-10-20 VITALS
RESPIRATION RATE: 10 BRPM | HEART RATE: 64 BPM | TEMPERATURE: 98 F | BODY MASS INDEX: 27 KG/M2 | WEIGHT: 136 LBS | SYSTOLIC BLOOD PRESSURE: 132 MMHG | DIASTOLIC BLOOD PRESSURE: 60 MMHG

## 2017-10-20 DIAGNOSIS — R74.01 TRANSAMINITIS: ICD-10-CM

## 2017-10-20 DIAGNOSIS — I73.9 PERIPHERAL ARTERIAL DISEASE (HCC): ICD-10-CM

## 2017-10-20 DIAGNOSIS — E11.65 CONTROLLED TYPE 2 DIABETES MELLITUS WITH HYPERGLYCEMIA, WITHOUT LONG-TERM CURRENT USE OF INSULIN (HCC): Primary | ICD-10-CM

## 2017-10-20 DIAGNOSIS — E11.65 CONTROLLED TYPE 2 DIABETES MELLITUS WITH HYPERGLYCEMIA, WITHOUT LONG-TERM CURRENT USE OF INSULIN (HCC): ICD-10-CM

## 2017-10-20 DIAGNOSIS — R53.83 FATIGUE, UNSPECIFIED TYPE: ICD-10-CM

## 2017-10-20 DIAGNOSIS — I10 ESSENTIAL HYPERTENSION: ICD-10-CM

## 2017-10-20 DIAGNOSIS — M79.672 BILATERAL FOOT PAIN: ICD-10-CM

## 2017-10-20 DIAGNOSIS — E55.9 VITAMIN D DEFICIENCY: ICD-10-CM

## 2017-10-20 DIAGNOSIS — M79.671 BILATERAL FOOT PAIN: ICD-10-CM

## 2017-10-20 PROCEDURE — 36415 COLL VENOUS BLD VENIPUNCTURE: CPT | Performed by: INTERNAL MEDICINE

## 2017-10-20 PROCEDURE — 80050 GENERAL HEALTH PANEL: CPT | Performed by: INTERNAL MEDICINE

## 2017-10-20 PROCEDURE — 83036 HEMOGLOBIN GLYCOSYLATED A1C: CPT | Performed by: INTERNAL MEDICINE

## 2017-10-20 PROCEDURE — 99214 OFFICE O/P EST MOD 30 MIN: CPT | Performed by: INTERNAL MEDICINE

## 2017-10-20 PROCEDURE — 80061 LIPID PANEL: CPT | Performed by: INTERNAL MEDICINE

## 2017-10-20 PROCEDURE — 82306 VITAMIN D 25 HYDROXY: CPT | Performed by: INTERNAL MEDICINE

## 2017-10-20 RX ORDER — CARVEDILOL 25 MG/1
25 TABLET ORAL 2 TIMES DAILY WITH MEALS
Refills: 1 | COMMUNITY
Start: 2017-09-30 | End: 2018-03-13

## 2017-10-20 NOTE — PATIENT INSTRUCTIONS
- Check blood work today  - Follow up with Dr. Derrick Valentine for the eyes  - Follow up with Dr. Kristine Fagan for leg pain/vascular problems   - Follow up with Dr. Lili Alvarez for the feet/toenails. It was a pleasure seeing you in the clinic today.   Thank you for [employer]

## 2017-10-20 NOTE — PROGRESS NOTES
Dominique Castro is a 69year old female. HPI:   No chief complaint on file. Patient presents for follow up on chronic medical issues. Diabetes is weill controlled. AM fasting in the low 100's to high 90's. 110's to 120's throughout the day.   No c Tab, Take 1 tablet by mouth daily. , Disp: , Rfl: 4  •  Senna-Docusate Sodium 8.6-50 MG Oral Tab, Take 2 tablets by mouth daily as needed for constipation. , Disp: 30 tablet, Rfl: 3  •  MetFORMIN HCl 500 MG Oral Tab, Take 1 tablet (500 mg total) by mouth violet disease, bilateral foot pain  More pain in legs/foot lately. Did have arterial stenosis/blockages on previous arterial dopplers. Could be causing symptoms. Will have patient see vascular surgery for further evaluation.   Continue meloxicam as needed for j

## 2017-10-21 PROBLEM — E55.9 VITAMIN D DEFICIENCY: Status: ACTIVE | Noted: 2017-10-21

## 2017-10-30 RX ORDER — FUROSEMIDE 20 MG/1
TABLET ORAL
Qty: 30 TABLET | Refills: 0 | Status: SHIPPED | OUTPATIENT
Start: 2017-10-30 | End: 2017-11-13

## 2017-11-13 RX ORDER — FUROSEMIDE 20 MG/1
TABLET ORAL
Qty: 30 TABLET | Refills: 1 | Status: SHIPPED | OUTPATIENT
Start: 2017-11-13 | End: 2018-02-09

## 2017-11-13 NOTE — TELEPHONE ENCOUNTER
Passed protocol, do not know if you wanted to continue med   Requesting Furosemide  LOV: 5/11//17 for furosemide lov 10/20/17  RTC:   Re-assess in 1 month, goal will be to titrate off furosemide completely  Last Relevant Labs: 10/20/17   Filled:   Yuosuf Martinez

## 2017-12-29 RX ORDER — CARVEDILOL 25 MG/1
TABLET ORAL
Qty: 180 TABLET | Refills: 0 | Status: SHIPPED | OUTPATIENT
Start: 2017-12-29 | End: 2018-03-28

## 2017-12-29 NOTE — TELEPHONE ENCOUNTER
Carvedilol 25 mg 1 tab BID filled 8-9-17 180 with 1 refill     LOV 5-11-17     Continue carvedilol 25 mg BID    return to clinic in 1 month     Labs 10-20-17

## 2018-02-12 PROBLEM — R20.2 NUMBNESS AND TINGLING IN LEFT ARM: Status: ACTIVE | Noted: 2018-02-12

## 2018-02-12 PROBLEM — R20.2 NUMBNESS AND TINGLING OF LEFT LEG: Status: ACTIVE | Noted: 2018-02-12

## 2018-02-12 PROBLEM — I77.9 PERIPHERAL ARTERIAL OCCLUSIVE DISEASE (HCC): Status: ACTIVE | Noted: 2018-02-12

## 2018-02-12 PROBLEM — R20.0 NUMBNESS AND TINGLING OF LEFT LEG: Status: ACTIVE | Noted: 2018-02-12

## 2018-02-12 PROBLEM — R20.0 NUMBNESS AND TINGLING IN LEFT ARM: Status: ACTIVE | Noted: 2018-02-12

## 2018-02-12 RX ORDER — FUROSEMIDE 20 MG/1
TABLET ORAL
Qty: 30 TABLET | Refills: 2 | Status: SHIPPED | OUTPATIENT
Start: 2018-02-12 | End: 2018-05-09

## 2018-02-12 NOTE — TELEPHONE ENCOUNTER
Furosemide 20 mg 1 tab daily filled 11-13-17 30 with 1 refill     LOV 10-20-17      return to clinic in 3-6 months     Labs 10-20-17

## 2018-02-22 RX ORDER — FUROSEMIDE 20 MG/1
TABLET ORAL
Qty: 30 TABLET | Refills: 0 | OUTPATIENT
Start: 2018-02-22

## 2018-02-22 NOTE — TELEPHONE ENCOUNTER
Medication(s) to Refill:   Pending Prescriptions Disp Refills    FUROSEMIDE 20 MG Oral Tab [Pharmacy Med Name: FUROSEMIDE 20MG TABLETS] 30 tablet 0     Sig: TAKE ONE TABLET BY MOUTH DAILY           Last Time Medication was Filled: 2/12/18 with 30 R2      L

## 2018-03-12 ENCOUNTER — TELEPHONE (OUTPATIENT)
Dept: INTERNAL MEDICINE CLINIC | Facility: CLINIC | Age: 78
End: 2018-03-12

## 2018-03-12 NOTE — TELEPHONE ENCOUNTER
Patient requesting a [facility] on [facility] [address] [facility]. The perscriptions were refused but she has made an appointment to be seen tomorrow. Patient is all out of her medication.

## 2018-03-13 ENCOUNTER — OFFICE VISIT (OUTPATIENT)
Dept: INTERNAL MEDICINE CLINIC | Facility: CLINIC | Age: 78
End: 2018-03-13

## 2018-03-13 VITALS
OXYGEN SATURATION: 98 % | WEIGHT: 138.25 LBS | HEART RATE: 59 BPM | RESPIRATION RATE: 12 BRPM | SYSTOLIC BLOOD PRESSURE: 112 MMHG | BODY MASS INDEX: 23.6 KG/M2 | HEIGHT: 64 IN | TEMPERATURE: 99 F | DIASTOLIC BLOOD PRESSURE: 52 MMHG

## 2018-03-13 DIAGNOSIS — E11.65 CONTROLLED TYPE 2 DIABETES MELLITUS WITH HYPERGLYCEMIA, WITHOUT LONG-TERM CURRENT USE OF INSULIN (HCC): ICD-10-CM

## 2018-03-13 DIAGNOSIS — R60.9 DEPENDENT EDEMA: ICD-10-CM

## 2018-03-13 DIAGNOSIS — R20.2 NUMBNESS AND TINGLING IN LEFT ARM: Primary | ICD-10-CM

## 2018-03-13 DIAGNOSIS — I77.9 PERIPHERAL ARTERIAL OCCLUSIVE DISEASE (HCC): ICD-10-CM

## 2018-03-13 DIAGNOSIS — R20.0 NUMBNESS AND TINGLING OF LEFT LEG: ICD-10-CM

## 2018-03-13 DIAGNOSIS — I10 ESSENTIAL HYPERTENSION: ICD-10-CM

## 2018-03-13 DIAGNOSIS — R20.2 NUMBNESS AND TINGLING OF LEFT LEG: ICD-10-CM

## 2018-03-13 DIAGNOSIS — R20.0 NUMBNESS AND TINGLING IN LEFT ARM: Primary | ICD-10-CM

## 2018-03-13 PROCEDURE — 99214 OFFICE O/P EST MOD 30 MIN: CPT | Performed by: INTERNAL MEDICINE

## 2018-03-13 RX ORDER — AMLODIPINE BESYLATE 5 MG/1
5 TABLET ORAL DAILY
Qty: 90 TABLET | Refills: 1 | Status: SHIPPED | OUTPATIENT
Start: 2018-03-13 | End: 2018-09-02

## 2018-03-13 RX ORDER — POLYETHYLENE GLYCOL 3350 17 G/17G
17 POWDER, FOR SOLUTION ORAL DAILY
Qty: 1 BOTTLE | Refills: 6 | Status: SHIPPED | OUTPATIENT
Start: 2018-03-13

## 2018-03-13 NOTE — PROGRESS NOTES
Nika Case is a 69year old female. HPI:   Patient presents with:  Medication Follow-Up  Patient presents for follow up on chronic medical issues. Here with daughter. She was seen by Vascular surgery for her numbness and tingling of legs/arms.   Al tablet, Rfl: 1  •  HYDRALAZINE  MG Oral Tab, TAKE 1 TABLET(100 MG) BY MOUTH TWICE DAILY WITH MEALS, Disp: 180 tablet, Rfl: 1  •  Azelastine HCl 0.05 % Ophthalmic Solution, Place 1 drop into both eyes daily. , Disp: 6 mL, Rfl: 1  •  Meloxicam 15 MG Or perspective. Will have patient see Neurology for further evaluation - referred to [facility]/Dr. Don July. 4.  Essential hypertension  Stable, at goal, diastolic actually on the low side. Will decrease amlodipine to 5 mg qd.   Continue other medications as

## 2018-03-13 NOTE — PATIENT INSTRUCTIONS
- We will lower the dose of your amlodipine from 10 to 5 mg.    - I sent in a prescription for the Miralax  - Continue all other medications  - Follow up in the clinic in 3 months - we will do the blood work then.     - My schedule: Monday 9-7, Wednesday 9-

## 2018-03-29 RX ORDER — CARVEDILOL 25 MG/1
TABLET ORAL
Qty: 180 TABLET | Refills: 0 | Status: SHIPPED | OUTPATIENT
Start: 2018-03-29 | End: 2018-06-30

## 2018-03-29 NOTE — TELEPHONE ENCOUNTER
Refill requested: Carvedilol 25 mg      Passed protocol      Last refill: 12/29/17  #180 NR  Relevant Labs: NA  Last OV / RTC advised: 3/13/18 - Crystal RTC 3 months (6/2018)  Appt Scheduled: No  Your appointments     Date & Time Appointment Department (Ce

## 2018-04-11 RX ORDER — HYDRALAZINE HYDROCHLORIDE 100 MG/1
TABLET, FILM COATED ORAL
Qty: 180 TABLET | Refills: 0 | Status: SHIPPED | OUTPATIENT
Start: 2018-04-11 | End: 2018-07-05

## 2018-04-11 RX ORDER — LOSARTAN POTASSIUM 100 MG/1
TABLET ORAL
Qty: 90 TABLET | Refills: 0 | Status: SHIPPED | OUTPATIENT
Start: 2018-04-11 | End: 2018-07-05

## 2018-04-11 RX ORDER — AMLODIPINE BESYLATE 10 MG/1
TABLET ORAL
Qty: 90 TABLET | Refills: 0 | Status: SHIPPED | OUTPATIENT
Start: 2018-04-11 | End: 2018-05-25 | Stop reason: DRUGHIGH

## 2018-04-11 RX ORDER — CLONIDINE HYDROCHLORIDE 0.1 MG/1
TABLET ORAL
Qty: 180 TABLET | Refills: 0 | Status: SHIPPED | OUTPATIENT
Start: 2018-04-11 | End: 2018-07-05

## 2018-04-12 ENCOUNTER — OFFICE VISIT (OUTPATIENT)
Dept: NEUROLOGY | Facility: CLINIC | Age: 78
End: 2018-04-12

## 2018-04-12 VITALS
WEIGHT: 138 LBS | HEIGHT: 64 IN | OXYGEN SATURATION: 98 % | TEMPERATURE: 98 F | BODY MASS INDEX: 23.56 KG/M2 | HEART RATE: 62 BPM | SYSTOLIC BLOOD PRESSURE: 138 MMHG | DIASTOLIC BLOOD PRESSURE: 60 MMHG

## 2018-04-12 DIAGNOSIS — E11.40 PAINFUL DIABETIC NEUROPATHY (HCC): Primary | ICD-10-CM

## 2018-04-12 PROCEDURE — 99204 OFFICE O/P NEW MOD 45 MIN: CPT | Performed by: OTHER

## 2018-04-12 RX ORDER — GABAPENTIN 100 MG/1
200 CAPSULE ORAL NIGHTLY
Qty: 60 CAPSULE | Refills: 2 | Status: SHIPPED | OUTPATIENT
Start: 2018-04-12 | End: 2018-07-12

## 2018-04-12 NOTE — PROGRESS NOTES
HPI:    Patient ID: Steve Casas is a 69year old female. HPI  Tammy Larson is 69year old female with history of hypertension and diabetes who presents with complaints of tingling and numbness in both arms and legs.  History obtained in patient's na Prescriptions:  gabapentin 100 MG Oral Cap Take 2 capsules (200 mg total) by mouth nightly.  Disp: 60 capsule Rfl: 2   LOSARTAN 100 MG Oral Tab TAKE 1 TABLET(100 MG) BY MOUTH DAILY BEFORE LUNCH Disp: 90 tablet Rfl: 0   AMLODIPINE BESYLATE 10 MG Oral Tab BALAJI II, III, IV, VI :Pupils round, equal and reactive to light  and accommodation bilaterally. Extraocular muscle intact. Visual fields intact. V: Normal facial sensation   VII: Face is symmetric with normal strength. VIII: Normal hearing bilaterally.

## 2018-05-09 RX ORDER — FUROSEMIDE 20 MG/1
TABLET ORAL
Qty: 30 TABLET | Refills: 0 | Status: SHIPPED | OUTPATIENT
Start: 2018-05-09 | End: 2018-06-07

## 2018-05-25 ENCOUNTER — OFFICE VISIT (OUTPATIENT)
Dept: INTERNAL MEDICINE CLINIC | Facility: CLINIC | Age: 78
End: 2018-05-25

## 2018-05-25 ENCOUNTER — LAB ENCOUNTER (OUTPATIENT)
Dept: LAB | Age: 78
End: 2018-05-25
Attending: INTERNAL MEDICINE
Payer: MEDICAID

## 2018-05-25 ENCOUNTER — HOSPITAL ENCOUNTER (OUTPATIENT)
Dept: GENERAL RADIOLOGY | Age: 78
Discharge: HOME | End: 2018-05-25
Attending: INTERNAL MEDICINE
Payer: MEDICAID

## 2018-05-25 VITALS
DIASTOLIC BLOOD PRESSURE: 64 MMHG | BODY MASS INDEX: 22.66 KG/M2 | RESPIRATION RATE: 14 BRPM | HEIGHT: 64 IN | TEMPERATURE: 98 F | WEIGHT: 132.75 LBS | SYSTOLIC BLOOD PRESSURE: 124 MMHG | HEART RATE: 67 BPM | OXYGEN SATURATION: 96 %

## 2018-05-25 DIAGNOSIS — K59.00 CONSTIPATION, UNSPECIFIED CONSTIPATION TYPE: Primary | ICD-10-CM

## 2018-05-25 DIAGNOSIS — K59.00 CONSTIPATION, UNSPECIFIED CONSTIPATION TYPE: ICD-10-CM

## 2018-05-25 DIAGNOSIS — R68.2 DRY MOUTH: ICD-10-CM

## 2018-05-25 DIAGNOSIS — R10.84 GENERALIZED ABDOMINAL PAIN: ICD-10-CM

## 2018-05-25 DIAGNOSIS — M79.605 LEFT LEG PAIN: ICD-10-CM

## 2018-05-25 DIAGNOSIS — M79.602 LEFT ARM PAIN: ICD-10-CM

## 2018-05-25 PROCEDURE — 86038 ANTINUCLEAR ANTIBODIES: CPT

## 2018-05-25 PROCEDURE — 99214 OFFICE O/P EST MOD 30 MIN: CPT | Performed by: INTERNAL MEDICINE

## 2018-05-25 PROCEDURE — 36415 COLL VENOUS BLD VENIPUNCTURE: CPT

## 2018-05-25 PROCEDURE — 74018 RADEX ABDOMEN 1 VIEW: CPT | Performed by: INTERNAL MEDICINE

## 2018-05-25 PROCEDURE — 86225 DNA ANTIBODY NATIVE: CPT

## 2018-05-25 PROCEDURE — 86235 NUCLEAR ANTIGEN ANTIBODY: CPT

## 2018-05-25 RX ORDER — MELOXICAM 7.5 MG/1
7.5 TABLET ORAL DAILY
Qty: 30 TABLET | Refills: 0 | Status: SHIPPED | OUTPATIENT
Start: 2018-05-25 | End: 2018-05-25

## 2018-05-25 RX ORDER — MELOXICAM 7.5 MG/1
7.5 TABLET ORAL DAILY
Qty: 30 TABLET | Refills: 0 | Status: SHIPPED | OUTPATIENT
Start: 2018-05-25 | End: 2018-07-12

## 2018-05-25 NOTE — PROGRESS NOTES
Deana Lisa is a 69year old female. HPI:   Patient presents with:  Leg Pain  Arm Pain  Patient presents with several symptoms. For the past 2-3 days, she had been dealing with left-sided arm and leg pain.   She is actually feeling a lot better tod Oral Tab, Take 1 tablet (5 mg total) by mouth daily. , Disp: 90 tablet, Rfl: 1  •  MetFORMIN HCl 500 MG Oral Tab, Take 1 tablet (500 mg total) by mouth daily with breakfast., Disp: 90 tablet, Rfl: 1  •  Azelastine HCl 0.05 % Ophthalmic Solution, Place 1 giana has been evaluated by Vascular Surgery for her chronic leg and arm symptoms - though she does have some PVD, it was not felt to be severe enough to explain her symptoms. Seen by Neurology - was felt symptoms were secondary to diabetic neuropathy.   Glo Dixon

## 2018-05-25 NOTE — PATIENT INSTRUCTIONS
- Check blood test today for dry mouth  - We will check an x-ray to look at abdominal pain  - If pain comes back, re-start gabapentin prescribed by Dr. Gerardo Horton  - If this does not help, start meloxicam.    It was a pleasure seeing you in the clinic today.

## 2018-06-07 RX ORDER — FUROSEMIDE 20 MG/1
20 TABLET ORAL DAILY
Qty: 90 TABLET | Refills: 0 | Status: SHIPPED | OUTPATIENT
Start: 2018-06-07 | End: 2018-09-02

## 2018-06-07 NOTE — TELEPHONE ENCOUNTER
Refill requested: Furosemide 20 mg     Passed protocol    Last refill: 5/9/18 #30 NR   Relevant Labs: CBC 10/20/17  Last OV / RTC advised: 5/25/18 RTC in 3 months   Appt Scheduled: No      *Refill passed protocol - sent to pharmacy*

## 2018-06-29 ENCOUNTER — TELEPHONE (OUTPATIENT)
Dept: INTERNAL MEDICINE CLINIC | Facility: CLINIC | Age: 78
End: 2018-06-29

## 2018-06-29 NOTE — TELEPHONE ENCOUNTER
[facility] member care coordinator just leaving a message in case we need to contact him for help regarding patient for any reason

## 2018-07-02 RX ORDER — CARVEDILOL 25 MG/1
25 TABLET ORAL 2 TIMES DAILY WITH MEALS
Qty: 180 TABLET | Refills: 1 | Status: SHIPPED | OUTPATIENT
Start: 2018-07-02 | End: 2018-12-30

## 2018-07-02 NOTE — TELEPHONE ENCOUNTER
Refill requested:  Carvedilol 25 mg     Passed protocol      Last refill: 3/29/2018 #180 NR  Relevant Labs: CMP 10/20/2017   BP Readings from Last 3 Encounters:  05/25/18 : 124/64  04/12/18 : 138/60  03/13/18 : 112/52      Last OV / RTC advised: 5/25/2018

## 2018-07-05 RX ORDER — HYDRALAZINE HYDROCHLORIDE 100 MG/1
TABLET, FILM COATED ORAL
Qty: 180 TABLET | Refills: 0 | Status: SHIPPED | OUTPATIENT
Start: 2018-07-05 | End: 2018-10-02

## 2018-07-05 RX ORDER — CLONIDINE HYDROCHLORIDE 0.1 MG/1
TABLET ORAL
Qty: 180 TABLET | Refills: 0 | Status: SHIPPED | OUTPATIENT
Start: 2018-07-05 | End: 2018-10-02

## 2018-07-05 RX ORDER — LOSARTAN POTASSIUM 100 MG/1
TABLET ORAL
Qty: 90 TABLET | Refills: 0 | Status: SHIPPED | OUTPATIENT
Start: 2018-07-05 | End: 2018-10-02

## 2018-07-05 NOTE — TELEPHONE ENCOUNTER
Requesting:    Pending Prescriptions Disp Refills    LOSARTAN 100 MG Oral Tab [Pharmacy Med Name: LOSARTAN 100MG TABLETS] 90 tablet 0     Sig: TAKE 1 TABLET(100 MG) BY MOUTH DAILY BEFORE LUNCH      CLONIDINE HCL 0.1 MG Oral Tab [Pharmacy Med Name: Prem Meredith

## 2018-07-11 RX ORDER — GABAPENTIN 100 MG/1
CAPSULE ORAL
Qty: 60 CAPSULE | Refills: 0 | OUTPATIENT
Start: 2018-07-11

## 2018-07-12 ENCOUNTER — OFFICE VISIT (OUTPATIENT)
Dept: INTERNAL MEDICINE CLINIC | Facility: CLINIC | Age: 78
End: 2018-07-12

## 2018-07-12 VITALS
HEART RATE: 78 BPM | TEMPERATURE: 98 F | DIASTOLIC BLOOD PRESSURE: 62 MMHG | WEIGHT: 135.5 LBS | RESPIRATION RATE: 16 BRPM | HEIGHT: 64 IN | BODY MASS INDEX: 23.13 KG/M2 | SYSTOLIC BLOOD PRESSURE: 120 MMHG

## 2018-07-12 DIAGNOSIS — R10.13 EPIGASTRIC PAIN: ICD-10-CM

## 2018-07-12 DIAGNOSIS — I10 ESSENTIAL HYPERTENSION: Primary | ICD-10-CM

## 2018-07-12 DIAGNOSIS — K59.00 CONSTIPATION, UNSPECIFIED CONSTIPATION TYPE: ICD-10-CM

## 2018-07-12 DIAGNOSIS — R76.8 ANA POSITIVE: ICD-10-CM

## 2018-07-12 PROCEDURE — 99214 OFFICE O/P EST MOD 30 MIN: CPT | Performed by: INTERNAL MEDICINE

## 2018-07-12 RX ORDER — GABAPENTIN 100 MG/1
200 CAPSULE ORAL NIGHTLY
Qty: 90 CAPSULE | Refills: 3 | Status: SHIPPED | OUTPATIENT
Start: 2018-07-12 | End: 2018-09-05

## 2018-07-12 RX ORDER — OXYBUTYNIN CHLORIDE 5 MG/1
5 TABLET ORAL DAILY
Qty: 90 TABLET | Refills: 3 | Status: SHIPPED | OUTPATIENT
Start: 2018-07-12 | End: 2018-09-05

## 2018-07-12 RX ORDER — MELOXICAM 7.5 MG/1
7.5 TABLET ORAL DAILY
Qty: 30 TABLET | Refills: 3 | Status: ON HOLD | OUTPATIENT
Start: 2018-07-12 | End: 2019-01-30

## 2018-07-12 NOTE — PATIENT INSTRUCTIONS
Take these medications in MORNING/with breakfast:  - losartan, clonidine, hydralazine, carvedilol, metformin, meloxicam, furosemide    Take these medications in the EVENING/with dinner:  - amlodipine, clonidine, hydralazine, carvedilol, oxybutynin, cetiriz

## 2018-07-12 NOTE — PROGRESS NOTES
Lashay Caraballo is a 69year old female. HPI:   Patient presents with:  Blood Pressure: running high and low x 1 week   Patient presents for follow up on blood pressure. Has been getting fluctuating readings for the past week.    It has been as high as t 20 MG Oral Tab, Take 1 tablet (20 mg total) by mouth daily. , Disp: 90 tablet, Rfl: 0  •  Polyethylene Glycol 3350 Oral Powder, Take 17 g by mouth daily. , Disp: 1 Bottle, Rfl: 6  •  AmLODIPine Besylate 5 MG Oral Tab, Take 1 tablet (5 mg total) by mouth talha PM.      2.  MELO positive  +MELO, +SCL70, +dsDNA. Multiple joint pains, dry mouth, feels cold in evenings. Information again provided for in-network rheumatologists. 3/4.   Epigastric pain, constipation unspecified constipation type  OTC medications n

## 2018-07-16 ENCOUNTER — TELEPHONE (OUTPATIENT)
Dept: INTERNAL MEDICINE CLINIC | Facility: CLINIC | Age: 78
End: 2018-07-16

## 2018-07-16 NOTE — TELEPHONE ENCOUNTER
Patient's  called to request results for recent tests; did not see results.   He then stated that patient is experiencing a lot of gastrointestinal issues; please callback to triage

## 2018-08-01 ENCOUNTER — TELEPHONE (OUTPATIENT)
Dept: INTERNAL MEDICINE CLINIC | Facility: CLINIC | Age: 78
End: 2018-08-01

## 2018-08-31 ENCOUNTER — LAB ENCOUNTER (OUTPATIENT)
Dept: LAB | Age: 78
End: 2018-08-31
Payer: MEDICAID

## 2018-08-31 DIAGNOSIS — Z01.818 PRE-OP EVALUATION: Primary | ICD-10-CM

## 2018-08-31 LAB
ANION GAP SERPL CALC-SCNC: 9 MMOL/L (ref 0–18)
BASOPHILS # BLD AUTO: 0.03 X10(3) UL (ref 0–0.1)
BASOPHILS NFR BLD AUTO: 0.4 %
BUN BLD-MCNC: 36 MG/DL (ref 8–20)
BUN/CREAT SERPL: 27.1 (ref 10–20)
CALCIUM BLD-MCNC: 8.9 MG/DL (ref 8.3–10.3)
CHLORIDE SERPL-SCNC: 97 MMOL/L (ref 101–111)
CO2 SERPL-SCNC: 22 MMOL/L (ref 22–32)
CREAT BLD-MCNC: 1.33 MG/DL (ref 0.55–1.02)
EOSINOPHIL # BLD AUTO: 0.13 X10(3) UL (ref 0–0.3)
EOSINOPHIL NFR BLD AUTO: 1.7 %
ERYTHROCYTE [DISTWIDTH] IN BLOOD BY AUTOMATED COUNT: 12.8 % (ref 11.5–16)
GLUCOSE BLD-MCNC: 118 MG/DL (ref 70–99)
HCT VFR BLD AUTO: 34.7 % (ref 34–50)
HGB BLD-MCNC: 11.5 G/DL (ref 12–16)
IMMATURE GRANULOCYTE COUNT: 0.02 X10(3) UL (ref 0–1)
IMMATURE GRANULOCYTE RATIO %: 0.3 %
LYMPHOCYTES # BLD AUTO: 2.05 X10(3) UL (ref 0.9–4)
LYMPHOCYTES NFR BLD AUTO: 27.4 %
MCH RBC QN AUTO: 27.1 PG (ref 27–33.2)
MCHC RBC AUTO-ENTMCNC: 33.1 G/DL (ref 31–37)
MCV RBC AUTO: 81.8 FL (ref 81–100)
MONOCYTES # BLD AUTO: 0.67 X10(3) UL (ref 0.1–1)
MONOCYTES NFR BLD AUTO: 9 %
NEUTROPHIL ABS PRELIM: 4.57 X10 (3) UL (ref 1.3–6.7)
NEUTROPHILS # BLD AUTO: 4.57 X10(3) UL (ref 1.3–6.7)
NEUTROPHILS NFR BLD AUTO: 61.2 %
OSMOLALITY SERPL CALC.SUM OF ELEC: 275 MOSM/KG (ref 275–295)
PLATELET # BLD AUTO: 208 10(3)UL (ref 150–450)
POTASSIUM SERPL-SCNC: 4.1 MMOL/L (ref 3.6–5.1)
RBC # BLD AUTO: 4.24 X10(6)UL (ref 3.8–5.1)
RED CELL DISTRIBUTION WIDTH-SD: 38 FL (ref 35.1–46.3)
SODIUM SERPL-SCNC: 128 MMOL/L (ref 136–144)
WBC # BLD AUTO: 7.5 X10(3) UL (ref 4–13)

## 2018-08-31 PROCEDURE — 85025 COMPLETE CBC W/AUTO DIFF WBC: CPT

## 2018-08-31 PROCEDURE — 80048 BASIC METABOLIC PNL TOTAL CA: CPT

## 2018-08-31 PROCEDURE — 36415 COLL VENOUS BLD VENIPUNCTURE: CPT

## 2018-09-04 ENCOUNTER — TELEPHONE (OUTPATIENT)
Dept: INTERNAL MEDICINE CLINIC | Facility: CLINIC | Age: 78
End: 2018-09-04

## 2018-09-04 RX ORDER — FUROSEMIDE 20 MG/1
20 TABLET ORAL DAILY
Qty: 90 TABLET | Refills: 0 | Status: SHIPPED | OUTPATIENT
Start: 2018-09-04 | End: 2018-12-03

## 2018-09-04 RX ORDER — AMLODIPINE BESYLATE 5 MG/1
5 TABLET ORAL DAILY
Qty: 90 TABLET | Refills: 0 | Status: SHIPPED | OUTPATIENT
Start: 2018-09-04 | End: 2018-12-03

## 2018-09-04 NOTE — TELEPHONE ENCOUNTER
Refill requested: Metformin 500 mg + Furosemide 20 mg + Amlodipine 5 mg     Failed protocol - Metformin   Passed protocol    Last refill: 3/12/18 Metformin 500 mg #90 1R + 18 Furosemide 20 mg #90 NR + 3/13/18 Amlodipine 5 mg #90 1R    Relevant Labs: BMP 18    BP Readings from Last 3 Encounters:  18 : 152/83  18 : 120/62  18 : 124/64      Last OV / RTC advised: 18 Dr Derrick Rae RTC 3-6 months     Appt Scheduled: Yes   Your appointments     Date & Time Appointment Department [facility])    Sep 05, 2018  4:30 PM CDT Exam - Established Patient with Melita Kc MD [address] [facility])    Dec 13, 2018 12:45 PM CST Exam - New Patient with MD PEARL Alfredo LisaLizette (Extension Don Dye)        [address] [facility]  [address] [place] [address],Fourth Floor, Suite [facility] [id number]  [address] [place] [address]  17 [facility] [address] [place] [address] [phone]

## 2018-09-04 NOTE — TELEPHONE ENCOUNTER
Chico Mello has additional questions; reviewed that patient has upcoming pre op and has no showed last 2 scheduled appointments, please callback

## 2018-09-04 NOTE — TELEPHONE ENCOUNTER
[facility] needs pre op testing/clearance infor for this patient faxed to [phone] call with any questions to [facility]

## 2018-09-04 NOTE — TELEPHONE ENCOUNTER
Spoke with Mayco Delaney at [facility]. Pt having cataract surgery on Thursday and needs cbc, cmp, ekg and H&P. Informed pt was a no show on 8/31. Appointment given 9/5 at 4:30.   Mayco Delaney will reach out to pt and daughter to inform them pre-op required and of schedul

## 2018-09-05 ENCOUNTER — OFFICE VISIT (OUTPATIENT)
Dept: INTERNAL MEDICINE CLINIC | Facility: CLINIC | Age: 78
End: 2018-09-05
Payer: MEDICAID

## 2018-09-05 VITALS
RESPIRATION RATE: 18 BRPM | SYSTOLIC BLOOD PRESSURE: 136 MMHG | DIASTOLIC BLOOD PRESSURE: 68 MMHG | HEIGHT: 64 IN | WEIGHT: 131.25 LBS | BODY MASS INDEX: 22.41 KG/M2 | HEART RATE: 70 BPM | OXYGEN SATURATION: 96 % | TEMPERATURE: 98 F

## 2018-09-05 DIAGNOSIS — E11.65 CONTROLLED TYPE 2 DIABETES MELLITUS WITH HYPERGLYCEMIA, WITHOUT LONG-TERM CURRENT USE OF INSULIN (HCC): ICD-10-CM

## 2018-09-05 DIAGNOSIS — E87.1 HYPONATREMIA: ICD-10-CM

## 2018-09-05 DIAGNOSIS — I10 ESSENTIAL HYPERTENSION: ICD-10-CM

## 2018-09-05 DIAGNOSIS — D64.9 NORMOCYTIC ANEMIA: ICD-10-CM

## 2018-09-05 DIAGNOSIS — Z00.00 PREVENTATIVE HEALTH CARE: ICD-10-CM

## 2018-09-05 DIAGNOSIS — H26.9 CATARACT OF BOTH EYES, UNSPECIFIED CATARACT TYPE: ICD-10-CM

## 2018-09-05 DIAGNOSIS — I77.9 PERIPHERAL ARTERIAL OCCLUSIVE DISEASE (HCC): ICD-10-CM

## 2018-09-05 DIAGNOSIS — Z01.818 PREOP EXAMINATION: Primary | ICD-10-CM

## 2018-09-05 DIAGNOSIS — R79.89 ELEVATED SERUM CREATININE: ICD-10-CM

## 2018-09-05 PROCEDURE — 93000 ELECTROCARDIOGRAM COMPLETE: CPT | Performed by: INTERNAL MEDICINE

## 2018-09-05 PROCEDURE — 99214 OFFICE O/P EST MOD 30 MIN: CPT | Performed by: INTERNAL MEDICINE

## 2018-09-05 NOTE — PATIENT INSTRUCTIONS
- [id number] Garima Callahan for surgery tomorrow  - STOP furosemide (water pill/leg swelling pill). - We will re-check blood test in 1 month (First week of October). Make sure she is fasting (no food, water and medications only for 8 hours).       It was a pleasure seeing you i

## 2018-09-05 NOTE — PROGRESS NOTES
Miguel A Bean is a 69year old female who presents for a pre-operative physical exam.   Miguel A Bean is scheduled for a cataract procedure at [facility] on 9/6/18 performed by Dr. Fidencio Winn, who has requested that I provide pre-operative consultati Brother        Social History  Social History   Marital status:   Spouse name: N/A    Years of education: N/A  Number of children: N/A     Occupational History  None on file     Social History Main Topics   Smoking status: Never Smoker    Smokeless EOMI, normal lid and conjunctiva  LUNGS: clear to auscultation bilaterally, no wheezing/rubs  CARDIO: RRR without murmurs. No clubbing, cyanosis or edema.   GI: soft non tender nondistended no hepatosplenomegaly, bowel sounds throughout  NEURO: CN II-XII i Monitor for now. No melena/hematochezia/hemoptysis. Patient Care Team:  Long Moreno MD as PCP - General (Internal Medicine)  The patient indicates understanding of these issues and agrees to the plan.   The patient is asked to return to clinic in

## 2018-09-06 ENCOUNTER — TELEPHONE (OUTPATIENT)
Dept: INTERNAL MEDICINE CLINIC | Facility: CLINIC | Age: 78
End: 2018-09-06

## 2018-09-06 NOTE — TELEPHONE ENCOUNTER
Mariajose needs notes from most recent office visit and EKG asap patient has surgery today please fax [phone]

## 2018-09-19 ENCOUNTER — TELEPHONE (OUTPATIENT)
Dept: INTERNAL MEDICINE CLINIC | Facility: CLINIC | Age: 78
End: 2018-09-19

## 2018-09-19 NOTE — TELEPHONE ENCOUNTER
As long as she is not having headaches, chest pain, blurred vision - we can address things at office visit tomorrow. If she is having any of these symptoms, she can take an extra dose of either clonidine or hydralazine.   If symptoms still do not get jesus

## 2018-09-19 NOTE — TELEPHONE ENCOUNTER
Son called to report pt's b/p is elevated. No reading available as pt does not have a machine. No c/o headache, dizziness or sob. Pt is constipated. Last stool 24 hours ago. No c/o abdominal pain or bloating. Appointment scheduled tomorrow.   Requesti

## 2018-09-26 ENCOUNTER — TELEPHONE (OUTPATIENT)
Dept: INTERNAL MEDICINE CLINIC | Facility: CLINIC | Age: 78
End: 2018-09-26

## 2018-09-26 NOTE — TELEPHONE ENCOUNTER
Bobby Esparza, Care Coordinator. calling in to follow up on patients care. Please call [facility] Coordinator with any concerns pertaining pt @ [phone].

## 2018-10-03 RX ORDER — CLONIDINE HYDROCHLORIDE 0.1 MG/1
0.1 TABLET ORAL 2 TIMES DAILY
Qty: 180 TABLET | Refills: 0 | Status: SHIPPED | OUTPATIENT
Start: 2018-10-03 | End: 2019-01-04

## 2018-10-03 RX ORDER — HYDRALAZINE HYDROCHLORIDE 100 MG/1
100 TABLET, FILM COATED ORAL 2 TIMES DAILY
Qty: 180 TABLET | Refills: 0 | Status: SHIPPED | OUTPATIENT
Start: 2018-10-03 | End: 2019-01-04

## 2018-10-03 RX ORDER — LOSARTAN POTASSIUM 100 MG/1
100 TABLET ORAL DAILY
Qty: 90 TABLET | Refills: 0 | Status: SHIPPED | OUTPATIENT
Start: 2018-10-03 | End: 2019-01-04

## 2018-10-03 NOTE — TELEPHONE ENCOUNTER
Refill requested:   Requested Prescriptions     Pending Prescriptions Disp Refills   • CLONIDINE HCL 0.1 MG Oral Tab [Pharmacy Med Name: CLONIDINE 0.1MG TABLETS] 180 tablet 0     Sig: TAKE 1 TABLET(0.1 MG) BY MOUTH TWICE DAILY   • LOSARTAN 100 MG Oral Tab

## 2018-10-10 ENCOUNTER — TELEPHONE (OUTPATIENT)
Dept: INTERNAL MEDICINE CLINIC | Facility: CLINIC | Age: 78
End: 2018-10-10

## 2018-10-16 ENCOUNTER — LAB ENCOUNTER (OUTPATIENT)
Dept: LAB | Age: 78
End: 2018-10-16
Attending: INTERNAL MEDICINE
Payer: MEDICAID

## 2018-10-16 DIAGNOSIS — E87.1 HYPONATREMIA: ICD-10-CM

## 2018-10-16 DIAGNOSIS — D64.9 NORMOCYTIC ANEMIA: ICD-10-CM

## 2018-10-16 DIAGNOSIS — Z00.00 PREVENTATIVE HEALTH CARE: ICD-10-CM

## 2018-10-16 DIAGNOSIS — R79.89 ELEVATED SERUM CREATININE: ICD-10-CM

## 2018-10-16 DIAGNOSIS — E11.65 CONTROLLED TYPE 2 DIABETES MELLITUS WITH HYPERGLYCEMIA, WITHOUT LONG-TERM CURRENT USE OF INSULIN (HCC): ICD-10-CM

## 2018-10-16 PROCEDURE — 82043 UR ALBUMIN QUANTITATIVE: CPT

## 2018-10-16 PROCEDURE — 36415 COLL VENOUS BLD VENIPUNCTURE: CPT

## 2018-10-16 PROCEDURE — 80061 LIPID PANEL: CPT

## 2018-10-16 PROCEDURE — 80053 COMPREHEN METABOLIC PANEL: CPT

## 2018-10-16 PROCEDURE — 84443 ASSAY THYROID STIM HORMONE: CPT

## 2018-10-16 PROCEDURE — 83036 HEMOGLOBIN GLYCOSYLATED A1C: CPT

## 2018-10-16 PROCEDURE — 82570 ASSAY OF URINE CREATININE: CPT

## 2018-10-16 PROCEDURE — 85025 COMPLETE CBC W/AUTO DIFF WBC: CPT

## 2018-12-03 RX ORDER — AMLODIPINE BESYLATE 5 MG/1
5 TABLET ORAL DAILY
Qty: 90 TABLET | Refills: 0 | Status: SHIPPED | OUTPATIENT
Start: 2018-12-03 | End: 2019-03-01

## 2018-12-03 RX ORDER — FUROSEMIDE 20 MG/1
TABLET ORAL
Qty: 30 TABLET | Refills: 0 | Status: SHIPPED | OUTPATIENT
Start: 2018-12-03 | End: 2018-12-30

## 2018-12-03 NOTE — TELEPHONE ENCOUNTER
Refill requested:   Requested Prescriptions     Pending Prescriptions Disp Refills   • AmLODIPine Besylate 5 MG Oral Tab [Pharmacy Med Name: AMLODIPINE BESYLATE 5MG TABLETS] 90 tablet 0     Sig: Take 1 tablet (5 mg total) by mouth daily.      Passed protoco

## 2018-12-03 NOTE — TELEPHONE ENCOUNTER
Furosemide 20 mg 1 tab daily filled 9-4-18 90 with 0 refills     LOV 9-5-18     return to clinic in 2-3 months    Labs 10-16-18

## 2018-12-17 ENCOUNTER — HOSPITAL (OUTPATIENT)
Dept: OTHER | Age: 78
End: 2018-12-17
Attending: INTERNAL MEDICINE

## 2018-12-18 LAB — PATHOLOGIST NAME: NORMAL

## 2018-12-20 ENCOUNTER — TELEPHONE (OUTPATIENT)
Dept: INTERNAL MEDICINE CLINIC | Facility: CLINIC | Age: 78
End: 2018-12-20

## 2018-12-20 NOTE — TELEPHONE ENCOUNTER
Rita Cabello is care coordinator for [facility] meets quarterly with patient calling with update:  Patient has seen ophthalmologist 12-3-18 and has seen gastro specialist on 12-11-18 and was ordered two tests upper GI and colonoscopy.   He did not know if they were both

## 2018-12-31 RX ORDER — CARVEDILOL 25 MG/1
TABLET ORAL
Qty: 180 TABLET | Refills: 0 | Status: SHIPPED | OUTPATIENT
Start: 2018-12-31 | End: 2019-04-01

## 2018-12-31 RX ORDER — FUROSEMIDE 20 MG/1
TABLET ORAL
Qty: 30 TABLET | Refills: 0 | Status: ON HOLD | OUTPATIENT
Start: 2018-12-31 | End: 2019-01-29

## 2019-01-04 RX ORDER — HYDRALAZINE HYDROCHLORIDE 100 MG/1
100 TABLET, FILM COATED ORAL 2 TIMES DAILY
Qty: 180 TABLET | Refills: 0 | Status: SHIPPED | OUTPATIENT
Start: 2019-01-04 | End: 2019-04-01

## 2019-01-04 RX ORDER — LOSARTAN POTASSIUM 100 MG/1
100 TABLET ORAL DAILY
Qty: 90 TABLET | Refills: 0 | Status: SHIPPED | OUTPATIENT
Start: 2019-01-04 | End: 2019-04-02

## 2019-01-04 RX ORDER — CLONIDINE HYDROCHLORIDE 0.1 MG/1
0.1 TABLET ORAL 2 TIMES DAILY
Qty: 180 TABLET | Refills: 0 | Status: SHIPPED | OUTPATIENT
Start: 2019-01-04 | End: 2019-04-02

## 2019-01-04 NOTE — TELEPHONE ENCOUNTER
LMTCB x 1   Due for OV      Refill requested:   Requested Prescriptions     Pending Prescriptions Disp Refills   • losartan 100 MG Oral Tab [Pharmacy Med Name: LOSARTAN 100MG TABLETS] 90 tablet 0     Sig: Take 1 tablet (100 mg total) by mouth daily.    • Cl

## 2019-01-23 ENCOUNTER — TELEPHONE (OUTPATIENT)
Dept: INTERNAL MEDICINE CLINIC | Facility: CLINIC | Age: 79
End: 2019-01-23

## 2019-01-23 NOTE — TELEPHONE ENCOUNTER
Patient's Daughter, Obdulio Hollingsworth, calling in on behalf of Francisco Evangelista. Pt is cold and overall not feeling too well. Did not feel as though she could wait until tomorrow to be seen. Please call pt/pt Daughter to discuss symptoms +appointment options.

## 2019-01-23 NOTE — TELEPHONE ENCOUNTER
Daughter called requesting appointment with Dr Brody Newton today or tomorrow. Pt c/o low back pain and abdominal pain on and off x 1 week. Pt with decreased appetite, weakness, nausea, constipation and chills. No report of fever.   It was recommended jodi

## 2019-01-24 ENCOUNTER — OFFICE VISIT (OUTPATIENT)
Dept: INTERNAL MEDICINE CLINIC | Facility: CLINIC | Age: 79
End: 2019-01-24
Payer: MEDICAID

## 2019-01-24 ENCOUNTER — LAB ENCOUNTER (OUTPATIENT)
Dept: LAB | Age: 79
End: 2019-01-24
Attending: INTERNAL MEDICINE
Payer: MEDICAID

## 2019-01-24 VITALS
RESPIRATION RATE: 16 BRPM | WEIGHT: 127.75 LBS | SYSTOLIC BLOOD PRESSURE: 136 MMHG | TEMPERATURE: 98 F | DIASTOLIC BLOOD PRESSURE: 70 MMHG | HEIGHT: 64 IN | BODY MASS INDEX: 21.81 KG/M2 | HEART RATE: 80 BPM

## 2019-01-24 DIAGNOSIS — R63.0 DECREASED APPETITE: ICD-10-CM

## 2019-01-24 DIAGNOSIS — R11.0 NAUSEA: ICD-10-CM

## 2019-01-24 DIAGNOSIS — R10.30 LOWER ABDOMINAL PAIN: ICD-10-CM

## 2019-01-24 DIAGNOSIS — R30.0 DYSURIA: ICD-10-CM

## 2019-01-24 DIAGNOSIS — R14.0 ABDOMINAL DISTENSION: ICD-10-CM

## 2019-01-24 DIAGNOSIS — E11.65 CONTROLLED TYPE 2 DIABETES MELLITUS WITH HYPERGLYCEMIA, WITHOUT LONG-TERM CURRENT USE OF INSULIN (HCC): ICD-10-CM

## 2019-01-24 DIAGNOSIS — R30.0 DYSURIA: Primary | ICD-10-CM

## 2019-01-24 DIAGNOSIS — I10 ESSENTIAL HYPERTENSION: ICD-10-CM

## 2019-01-24 LAB
ALBUMIN SERPL-MCNC: 3.2 G/DL (ref 3.1–4.5)
ALBUMIN/GLOB SERPL: 0.6 {RATIO} (ref 1–2)
ALP LIVER SERPL-CCNC: 97 U/L (ref 55–142)
ALT SERPL-CCNC: 73 U/L (ref 14–54)
ANION GAP SERPL CALC-SCNC: 8 MMOL/L (ref 0–18)
AST SERPL-CCNC: 81 U/L (ref 15–41)
BASOPHILS # BLD AUTO: 0.03 X10(3) UL (ref 0–0.1)
BASOPHILS NFR BLD AUTO: 0.4 %
BILIRUB SERPL-MCNC: 0.6 MG/DL (ref 0.1–2)
BUN BLD-MCNC: 20 MG/DL (ref 8–20)
BUN/CREAT SERPL: 17.1 (ref 10–20)
CALCIUM BLD-MCNC: 9.1 MG/DL (ref 8.3–10.3)
CHLORIDE SERPL-SCNC: 102 MMOL/L (ref 101–111)
CO2 SERPL-SCNC: 23 MMOL/L (ref 22–32)
CREAT BLD-MCNC: 1.17 MG/DL (ref 0.55–1.02)
EOSINOPHIL # BLD AUTO: 0.06 X10(3) UL (ref 0–0.3)
EOSINOPHIL NFR BLD AUTO: 0.9 %
ERYTHROCYTE [DISTWIDTH] IN BLOOD BY AUTOMATED COUNT: 13.8 % (ref 11.5–16)
EST. AVERAGE GLUCOSE BLD GHB EST-MCNC: 151 MG/DL (ref 68–126)
GLOBULIN PLAS-MCNC: 5.4 G/DL (ref 2.8–4.4)
GLUCOSE BLD-MCNC: 140 MG/DL (ref 70–99)
HBA1C MFR BLD HPLC: 6.9 % (ref ?–5.7)
HCT VFR BLD AUTO: 38.1 % (ref 34–50)
HGB BLD-MCNC: 12.4 G/DL (ref 12–16)
IMMATURE GRANULOCYTE COUNT: 0.01 X10(3) UL (ref 0–1)
IMMATURE GRANULOCYTE RATIO %: 0.1 %
LYMPHOCYTES # BLD AUTO: 1.2 X10(3) UL (ref 0.9–4)
LYMPHOCYTES NFR BLD AUTO: 17.5 %
M PROTEIN MFR SERPL ELPH: 8.6 G/DL (ref 6.4–8.2)
MCH RBC QN AUTO: 26.3 PG (ref 27–33.2)
MCHC RBC AUTO-ENTMCNC: 32.5 G/DL (ref 31–37)
MCV RBC AUTO: 80.7 FL (ref 81–100)
MONOCYTES # BLD AUTO: 0.62 X10(3) UL (ref 0.1–1)
MONOCYTES NFR BLD AUTO: 9 %
NEUTROPHIL ABS PRELIM: 4.95 X10 (3) UL (ref 1.3–6.7)
NEUTROPHILS # BLD AUTO: 4.95 X10(3) UL (ref 1.3–6.7)
NEUTROPHILS NFR BLD AUTO: 72.1 %
OSMOLALITY SERPL CALC.SUM OF ELEC: 281 MOSM/KG (ref 275–295)
PLATELET # BLD AUTO: 166 10(3)UL (ref 150–450)
POTASSIUM SERPL-SCNC: 4 MMOL/L (ref 3.6–5.1)
RBC # BLD AUTO: 4.72 X10(6)UL (ref 3.8–5.1)
RED CELL DISTRIBUTION WIDTH-SD: 40.2 FL (ref 35.1–46.3)
SODIUM SERPL-SCNC: 133 MMOL/L (ref 136–144)
WBC # BLD AUTO: 6.9 X10(3) UL (ref 4–13)

## 2019-01-24 PROCEDURE — 83036 HEMOGLOBIN GLYCOSYLATED A1C: CPT

## 2019-01-24 PROCEDURE — 80053 COMPREHEN METABOLIC PANEL: CPT

## 2019-01-24 PROCEDURE — 99214 OFFICE O/P EST MOD 30 MIN: CPT | Performed by: INTERNAL MEDICINE

## 2019-01-24 PROCEDURE — 36415 COLL VENOUS BLD VENIPUNCTURE: CPT

## 2019-01-24 PROCEDURE — 85025 COMPLETE CBC W/AUTO DIFF WBC: CPT

## 2019-01-24 RX ORDER — SENNOSIDES 8.6 MG
8.6 TABLET ORAL 2 TIMES DAILY
Qty: 30 TABLET | Refills: 3 | Status: SHIPPED | OUTPATIENT
Start: 2019-01-24

## 2019-01-24 RX ORDER — BLOOD-GLUCOSE METER
1 EACH MISCELLANEOUS DAILY
Qty: 1 KIT | Refills: 0 | Status: SHIPPED | OUTPATIENT
Start: 2019-01-24 | End: 2020-01-24

## 2019-01-24 RX ORDER — CIPROFLOXACIN 500 MG/1
500 TABLET, FILM COATED ORAL 2 TIMES DAILY
Qty: 10 TABLET | Refills: 0 | Status: ON HOLD | OUTPATIENT
Start: 2019-01-24 | End: 2019-02-02

## 2019-01-24 NOTE — PROGRESS NOTES
Sisi Younger is a 67year old female. HPI:   Patient presents with:  Back Pain: C/o back pain x 1 week; begins at lower back and radiates up. Does c/o urinary burning.    Abdominal Pain: Lower abdominal pain   Patient presents with acute gastrointestin CloNIDine HCl 0.1 MG Oral Tab, Take 1 tablet (0.1 mg total) by mouth 2 (two) times daily. , Disp: 180 tablet, Rfl: 0  •  HydrALAZINE HCl 100 MG Oral Tab, Take 1 tablet (100 mg total) by mouth 2 (two) times daily. , Disp: 180 tablet, Rfl: 0  •  CARVEDILOL 25 nourished,in no apparent distress  HEENT: atraumatic, PERRLA, EOMI, normal lid and conjunctiva  LUNGS: clear to auscultation bilaterally, no wheezing/rubs  CARDIO: RRR without murmurs. No clubbing, cyanosis or edema.   GI: soft, mildly distended, lower abd

## 2019-01-24 NOTE — PATIENT INSTRUCTIONS
- Start antibiotics (ciprofloxacin). Take 1 tablet twice daily for 5 days.  - Start Senna for constipation. Take 1 tablet daily.  - You will be contacted when ultrasound is approved. You can then schedule it.     It was a pleasure seeing you in the clini

## 2019-01-26 ENCOUNTER — APPOINTMENT (OUTPATIENT)
Dept: MRI IMAGING | Facility: HOSPITAL | Age: 79
DRG: 435 | End: 2019-01-26
Attending: EMERGENCY MEDICINE
Payer: MEDICAID

## 2019-01-26 ENCOUNTER — APPOINTMENT (OUTPATIENT)
Dept: CT IMAGING | Facility: HOSPITAL | Age: 79
DRG: 435 | End: 2019-01-26
Attending: EMERGENCY MEDICINE
Payer: MEDICAID

## 2019-01-26 ENCOUNTER — HOSPITAL ENCOUNTER (INPATIENT)
Facility: HOSPITAL | Age: 79
LOS: 7 days | Discharge: HOME HEALTH | DRG: 435 | End: 2019-02-02
Attending: EMERGENCY MEDICINE | Admitting: HOSPITALIST
Payer: MEDICAID

## 2019-01-26 DIAGNOSIS — Z71.89 COUNSELING REGARDING ADVANCE CARE PLANNING AND GOALS OF CARE: ICD-10-CM

## 2019-01-26 DIAGNOSIS — Z51.5 PALLIATIVE CARE ENCOUNTER: ICD-10-CM

## 2019-01-26 DIAGNOSIS — R16.0 LIVER MASSES: Primary | ICD-10-CM

## 2019-01-26 DIAGNOSIS — I82.220 IVC THROMBOSIS (HCC): ICD-10-CM

## 2019-01-26 LAB
ALBUMIN SERPL-MCNC: 3.3 G/DL (ref 3.1–4.5)
ALBUMIN/GLOB SERPL: 0.6 {RATIO} (ref 1–2)
ALP LIVER SERPL-CCNC: 101 U/L (ref 55–142)
ALT SERPL-CCNC: 71 U/L (ref 14–54)
ANION GAP SERPL CALC-SCNC: 8 MMOL/L (ref 0–18)
APTT PPP: 30.7 SECONDS (ref 26.1–34.6)
AST SERPL-CCNC: 104 U/L (ref 15–41)
BASOPHILS # BLD AUTO: 0.03 X10(3) UL (ref 0–0.1)
BASOPHILS NFR BLD AUTO: 0.4 %
BILIRUB SERPL-MCNC: 0.7 MG/DL (ref 0.1–2)
BILIRUB UR QL STRIP.AUTO: NEGATIVE
BUN BLD-MCNC: 21 MG/DL (ref 8–20)
BUN/CREAT SERPL: 15.6 (ref 10–20)
CALCIUM BLD-MCNC: 9.1 MG/DL (ref 8.3–10.3)
CHLORIDE SERPL-SCNC: 100 MMOL/L (ref 101–111)
CLARITY UR REFRACT.AUTO: CLEAR
CO2 SERPL-SCNC: 21 MMOL/L (ref 22–32)
CREAT BLD-MCNC: 1.35 MG/DL (ref 0.55–1.02)
EOSINOPHIL # BLD AUTO: 0.04 X10(3) UL (ref 0–0.3)
EOSINOPHIL NFR BLD AUTO: 0.6 %
ERYTHROCYTE [DISTWIDTH] IN BLOOD BY AUTOMATED COUNT: 13.8 % (ref 11.5–16)
GLOBULIN PLAS-MCNC: 5.5 G/DL (ref 2.8–4.4)
GLUCOSE BLD-MCNC: 121 MG/DL (ref 65–99)
GLUCOSE BLD-MCNC: 129 MG/DL (ref 70–99)
GLUCOSE UR STRIP.AUTO-MCNC: NEGATIVE MG/DL
HCT VFR BLD AUTO: 36.3 % (ref 34–50)
HGB BLD-MCNC: 12 G/DL (ref 12–16)
IMMATURE GRANULOCYTE COUNT: 0.02 X10(3) UL (ref 0–1)
IMMATURE GRANULOCYTE RATIO %: 0.3 %
KETONES UR STRIP.AUTO-MCNC: NEGATIVE MG/DL
LIPASE: 478 U/L (ref 73–393)
LYMPHOCYTES # BLD AUTO: 1.02 X10(3) UL (ref 0.9–4)
LYMPHOCYTES NFR BLD AUTO: 15.1 %
M PROTEIN MFR SERPL ELPH: 8.8 G/DL (ref 6.4–8.2)
MCH RBC QN AUTO: 26.2 PG (ref 27–33.2)
MCHC RBC AUTO-ENTMCNC: 33.1 G/DL (ref 31–37)
MCV RBC AUTO: 79.3 FL (ref 81–100)
MONOCYTES # BLD AUTO: 0.68 X10(3) UL (ref 0.1–1)
MONOCYTES NFR BLD AUTO: 10.1 %
NEUTROPHIL ABS PRELIM: 4.96 X10 (3) UL (ref 1.3–6.7)
NEUTROPHILS # BLD AUTO: 4.96 X10(3) UL (ref 1.3–6.7)
NEUTROPHILS NFR BLD AUTO: 73.5 %
NITRITE UR QL STRIP.AUTO: NEGATIVE
OSMOLALITY SERPL CALC.SUM OF ELEC: 273 MOSM/KG (ref 275–295)
PH UR STRIP.AUTO: 6 [PH] (ref 4.5–8)
PLATELET # BLD AUTO: 158 10(3)UL (ref 150–450)
POTASSIUM SERPL-SCNC: 5 MMOL/L (ref 3.6–5.1)
PROT UR STRIP.AUTO-MCNC: NEGATIVE MG/DL
RBC # BLD AUTO: 4.58 X10(6)UL (ref 3.8–5.1)
RBC UR QL AUTO: NEGATIVE
RED CELL DISTRIBUTION WIDTH-SD: 39.1 FL (ref 35.1–46.3)
SODIUM SERPL-SCNC: 129 MMOL/L (ref 136–144)
SP GR UR STRIP.AUTO: 1.02 (ref 1–1.03)
UROBILINOGEN UR STRIP.AUTO-MCNC: <2 MG/DL
WBC # BLD AUTO: 6.8 X10(3) UL (ref 4–13)

## 2019-01-26 PROCEDURE — 99223 1ST HOSP IP/OBS HIGH 75: CPT | Performed by: STUDENT IN AN ORGANIZED HEALTH CARE EDUCATION/TRAINING PROGRAM

## 2019-01-26 PROCEDURE — 72158 MRI LUMBAR SPINE W/O & W/DYE: CPT | Performed by: EMERGENCY MEDICINE

## 2019-01-26 PROCEDURE — 74177 CT ABD & PELVIS W/CONTRAST: CPT | Performed by: EMERGENCY MEDICINE

## 2019-01-26 PROCEDURE — 72197 MRI PELVIS W/O & W/DYE: CPT | Performed by: EMERGENCY MEDICINE

## 2019-01-26 RX ORDER — LORAZEPAM 2 MG/ML
0.5 INJECTION INTRAMUSCULAR ONCE AS NEEDED
Status: ACTIVE | OUTPATIENT
Start: 2019-01-26 | End: 2019-01-26

## 2019-01-26 RX ORDER — ACETAMINOPHEN 325 MG/1
650 TABLET ORAL EVERY 8 HOURS PRN
Status: DISCONTINUED | OUTPATIENT
Start: 2019-01-26 | End: 2019-02-02

## 2019-01-26 RX ORDER — ONDANSETRON 2 MG/ML
4 INJECTION INTRAMUSCULAR; INTRAVENOUS EVERY 6 HOURS PRN
Status: DISCONTINUED | OUTPATIENT
Start: 2019-01-26 | End: 2019-02-02

## 2019-01-26 RX ORDER — POLYETHYLENE GLYCOL 3350 17 G/17G
17 POWDER, FOR SOLUTION ORAL DAILY PRN
Status: DISCONTINUED | OUTPATIENT
Start: 2019-01-26 | End: 2019-01-28

## 2019-01-26 RX ORDER — AMLODIPINE BESYLATE 5 MG/1
5 TABLET ORAL DAILY
Status: DISCONTINUED | OUTPATIENT
Start: 2019-01-26 | End: 2019-02-02

## 2019-01-26 RX ORDER — HEPARIN SODIUM AND DEXTROSE 10000; 5 [USP'U]/100ML; G/100ML
18 INJECTION INTRAVENOUS ONCE
Status: COMPLETED | OUTPATIENT
Start: 2019-01-26 | End: 2019-01-27

## 2019-01-26 RX ORDER — MORPHINE SULFATE 4 MG/ML
4 INJECTION, SOLUTION INTRAMUSCULAR; INTRAVENOUS EVERY 2 HOUR PRN
Status: DISCONTINUED | OUTPATIENT
Start: 2019-01-26 | End: 2019-02-02

## 2019-01-26 RX ORDER — DEXTROSE MONOHYDRATE 25 G/50ML
50 INJECTION, SOLUTION INTRAVENOUS
Status: DISCONTINUED | OUTPATIENT
Start: 2019-01-26 | End: 2019-02-02

## 2019-01-26 RX ORDER — HEPARIN SODIUM AND DEXTROSE 10000; 5 [USP'U]/100ML; G/100ML
INJECTION INTRAVENOUS CONTINUOUS
Status: DISCONTINUED | OUTPATIENT
Start: 2019-01-27 | End: 2019-02-02

## 2019-01-26 RX ORDER — SODIUM CHLORIDE 9 MG/ML
INJECTION, SOLUTION INTRAVENOUS CONTINUOUS
Status: DISCONTINUED | OUTPATIENT
Start: 2019-01-26 | End: 2019-01-28

## 2019-01-26 RX ORDER — CARVEDILOL 12.5 MG/1
25 TABLET ORAL 2 TIMES DAILY WITH MEALS
Status: DISCONTINUED | OUTPATIENT
Start: 2019-01-27 | End: 2019-02-02

## 2019-01-26 RX ORDER — MORPHINE SULFATE 4 MG/ML
2 INJECTION, SOLUTION INTRAMUSCULAR; INTRAVENOUS EVERY 2 HOUR PRN
Status: DISCONTINUED | OUTPATIENT
Start: 2019-01-26 | End: 2019-02-02

## 2019-01-26 RX ORDER — SODIUM CHLORIDE 9 MG/ML
125 INJECTION, SOLUTION INTRAVENOUS CONTINUOUS
Status: DISCONTINUED | OUTPATIENT
Start: 2019-01-26 | End: 2019-01-27

## 2019-01-26 RX ORDER — METOCLOPRAMIDE HYDROCHLORIDE 5 MG/ML
5 INJECTION INTRAMUSCULAR; INTRAVENOUS EVERY 8 HOURS PRN
Status: DISCONTINUED | OUTPATIENT
Start: 2019-01-26 | End: 2019-02-02

## 2019-01-26 RX ORDER — CLONIDINE HYDROCHLORIDE 0.1 MG/1
0.1 TABLET ORAL 2 TIMES DAILY
Status: DISCONTINUED | OUTPATIENT
Start: 2019-01-26 | End: 2019-02-02

## 2019-01-26 RX ORDER — HEPARIN SODIUM 5000 [USP'U]/ML
80 INJECTION INTRAVENOUS; SUBCUTANEOUS ONCE
Status: COMPLETED | OUTPATIENT
Start: 2019-01-26 | End: 2019-01-26

## 2019-01-26 RX ORDER — MORPHINE SULFATE 4 MG/ML
1 INJECTION, SOLUTION INTRAMUSCULAR; INTRAVENOUS EVERY 2 HOUR PRN
Status: DISCONTINUED | OUTPATIENT
Start: 2019-01-26 | End: 2019-02-02

## 2019-01-26 RX ORDER — HYDRALAZINE HYDROCHLORIDE 50 MG/1
100 TABLET, FILM COATED ORAL 2 TIMES DAILY
Status: DISCONTINUED | OUTPATIENT
Start: 2019-01-26 | End: 2019-02-02

## 2019-01-26 RX ORDER — BISACODYL 10 MG
10 SUPPOSITORY, RECTAL RECTAL
Status: DISCONTINUED | OUTPATIENT
Start: 2019-01-26 | End: 2019-02-02

## 2019-01-26 RX ORDER — LOSARTAN POTASSIUM 100 MG/1
100 TABLET ORAL DAILY
Status: DISCONTINUED | OUTPATIENT
Start: 2019-01-26 | End: 2019-01-27

## 2019-01-26 NOTE — ED INITIAL ASSESSMENT (HPI)
Pt c/o diffuse abdominal pain x4-5 day.      Last BM today     Was seen yesterday at [facility] and dx w/ UTI = on medication x24 hours

## 2019-01-26 NOTE — ED PROVIDER NOTES
Patient Seen in: [facility] Emergency Department    History   Patient presents with:  Abdomen/Flank Pain (GI/)  Nausea/Vomiting/Diarrhea (gastrointestinal)  Stated Complaint: abd pain    HPI    This is a pleasant 80-year-old female presenting to t 273 (*)     GFR, Non- 38 (*)     GFR, -American 43 (*)      (*)     Alt 71 (*)     Total Protein 8.8 (*)     Globulin  5.5 (*)     A/G Ratio 0.6 (*)     All other components within normal limits   LIPASE - Abnormal; Notable fo

## 2019-01-27 ENCOUNTER — APPOINTMENT (OUTPATIENT)
Dept: ULTRASOUND IMAGING | Facility: HOSPITAL | Age: 79
DRG: 435 | End: 2019-01-27
Attending: STUDENT IN AN ORGANIZED HEALTH CARE EDUCATION/TRAINING PROGRAM
Payer: MEDICAID

## 2019-01-27 LAB
AFP-TM SERPL-MCNC: 175.2 NG/ML (ref ?–8)
ALBUMIN SERPL-MCNC: 2.9 G/DL (ref 3.1–4.5)
ALBUMIN/GLOB SERPL: 0.6 {RATIO} (ref 1–2)
ALP LIVER SERPL-CCNC: 80 U/L (ref 55–142)
ALT SERPL-CCNC: 59 U/L (ref 14–54)
ANION GAP SERPL CALC-SCNC: 9 MMOL/L (ref 0–18)
APTT PPP: 135.3 SECONDS (ref 26.1–34.6)
APTT PPP: 82.2 SECONDS (ref 26.1–34.6)
AST SERPL-CCNC: 70 U/L (ref 15–41)
BASOPHILS # BLD AUTO: 0.01 X10(3) UL (ref 0–0.1)
BASOPHILS NFR BLD AUTO: 0.1 %
BILIRUB SERPL-MCNC: 0.9 MG/DL (ref 0.1–2)
BUN BLD-MCNC: 19 MG/DL (ref 8–20)
BUN/CREAT SERPL: 13 (ref 10–20)
CALCIUM BLD-MCNC: 8.7 MG/DL (ref 8.3–10.3)
CANCER AG19-9 SERPL-ACNC: 144.8 U/ML (ref ?–37)
CEA SERPL-MCNC: 0.6 NG/ML (ref 0.5–5)
CHLORIDE SERPL-SCNC: 105 MMOL/L (ref 101–111)
CO2 SERPL-SCNC: 22 MMOL/L (ref 22–32)
CREAT BLD-MCNC: 1.46 MG/DL (ref 0.55–1.02)
DEPRECATED HBV CORE AB SER IA-ACNC: 77.7 NG/ML (ref 18–340)
EOSINOPHIL # BLD AUTO: 0.01 X10(3) UL (ref 0–0.3)
EOSINOPHIL NFR BLD AUTO: 0.1 %
ERYTHROCYTE [DISTWIDTH] IN BLOOD BY AUTOMATED COUNT: 14 % (ref 11.5–16)
GLOBULIN PLAS-MCNC: 4.7 G/DL (ref 2.8–4.4)
GLUCOSE BLD-MCNC: 124 MG/DL (ref 65–99)
GLUCOSE BLD-MCNC: 124 MG/DL (ref 65–99)
GLUCOSE BLD-MCNC: 133 MG/DL (ref 70–99)
GLUCOSE BLD-MCNC: 142 MG/DL (ref 65–99)
GLUCOSE BLD-MCNC: 145 MG/DL (ref 65–99)
HAV IGM SER QL: NONREACTIVE
HBV CORE IGM SER QL: NONREACTIVE
HBV SURFACE AG SERPL QL IA: NONREACTIVE
HCT VFR BLD AUTO: 34 % (ref 34–50)
HGB BLD-MCNC: 11.2 G/DL (ref 12–16)
IMMATURE GRANULOCYTE COUNT: 0.02 X10(3) UL (ref 0–1)
IMMATURE GRANULOCYTE RATIO %: 0.2 %
INR BLD: 1.07 (ref 0.9–1.1)
IRON SATURATION: 11 % (ref 15–50)
IRON: 50 UG/DL (ref 28–170)
LYMPHOCYTES # BLD AUTO: 0.59 X10(3) UL (ref 0.9–4)
LYMPHOCYTES NFR BLD AUTO: 7.3 %
M PROTEIN MFR SERPL ELPH: 7.6 G/DL (ref 6.4–8.2)
MCH RBC QN AUTO: 26.3 PG (ref 27–33.2)
MCHC RBC AUTO-ENTMCNC: 32.9 G/DL (ref 31–37)
MCV RBC AUTO: 79.8 FL (ref 81–100)
MONOCYTES # BLD AUTO: 0.5 X10(3) UL (ref 0.1–1)
MONOCYTES NFR BLD AUTO: 6.2 %
NEUTROPHIL ABS PRELIM: 7 X10 (3) UL (ref 1.3–6.7)
NEUTROPHILS # BLD AUTO: 7 X10(3) UL (ref 1.3–6.7)
NEUTROPHILS NFR BLD AUTO: 86.1 %
OSMOLALITY SERPL CALC.SUM OF ELEC: 286 MOSM/KG (ref 275–295)
PLATELET # BLD AUTO: 152 10(3)UL (ref 150–450)
POTASSIUM SERPL-SCNC: 3.6 MMOL/L (ref 3.6–5.1)
PSA SERPL DL<=0.01 NG/ML-MCNC: 14.3 SECONDS (ref 12.4–14.7)
RBC # BLD AUTO: 4.26 X10(6)UL (ref 3.8–5.1)
RED CELL DISTRIBUTION WIDTH-SD: 40.3 FL (ref 35.1–46.3)
SODIUM SERPL-SCNC: 136 MMOL/L (ref 136–144)
TOTAL IRON BINDING CAPACITY: 440 UG/DL (ref 240–450)
TRANSFERRIN SERPL-MCNC: 295 MG/DL (ref 200–360)
TSI SER-ACNC: 2.33 MIU/ML (ref 0.35–5.5)
WBC # BLD AUTO: 8.1 X10(3) UL (ref 4–13)

## 2019-01-27 PROCEDURE — 93975 VASCULAR STUDY: CPT | Performed by: STUDENT IN AN ORGANIZED HEALTH CARE EDUCATION/TRAINING PROGRAM

## 2019-01-27 PROCEDURE — 99233 SBSQ HOSP IP/OBS HIGH 50: CPT | Performed by: HOSPITALIST

## 2019-01-27 PROCEDURE — 76700 US EXAM ABDOM COMPLETE: CPT | Performed by: STUDENT IN AN ORGANIZED HEALTH CARE EDUCATION/TRAINING PROGRAM

## 2019-01-27 PROCEDURE — 99223 1ST HOSP IP/OBS HIGH 75: CPT | Performed by: INTERNAL MEDICINE

## 2019-01-27 RX ORDER — LIDOCAINE 50 MG/G
1 PATCH TOPICAL EVERY 24 HOURS
Status: DISCONTINUED | OUTPATIENT
Start: 2019-01-27 | End: 2019-02-02

## 2019-01-27 NOTE — CONSULTS
[facility]                       Gastroenterology [address] [facility] Gastroenterology    [place] Helderannalisa Patient Status:  Inpatient    1940 MRN [id number]   [facility] 4NW-A Attending Rachelle Jeter, [address] [place] [address] (Porcine) 5000 UNIT/ML injection 4,600 Units 80 Units/kg Intravenous Once   [COMPLETED] heparin (PORCINE) [id number]/250mL infusion ED INITIAL DOSE 18 Units/kg/hr Intravenous Once   heparin (PORCINE) drip 82836cxsnn/250mL infusion CONTINUOUS 200-3,000 Unit The patient denies alcohol. The patient has no history of IV drug use or other illicit substances. FamHx: The patient has no family history of colon cancer or other gastrointestinal malignancies;   No family history of ulcer disease, or inflammatory bowel rales  Abdomen: Soft, mildly distended with the presence of bowel sounds; Fullness in upper abdomen, no discrete masses.   Tender to palpation in RUQ  Ext: No peripheral edema or cyanosis  Skin: Warm and dry  Psychiatric: Appropriate mood and congruent affe occlusive. Clinical correlation recommended. 2. Noncalcified pulmonary nodule in the right lower lobe measuring up to 7 mm. Metastatic disease is not excluded. Clinical correlation recommended.      3. Mild diffuse colonic wall thickening is concer

## 2019-01-27 NOTE — PROGRESS NOTES
Patient ate small amount of soup and had a emesis about 20 min later. denies any c/o nausea at this time. patient family at bedside. states pain medication was effective.

## 2019-01-27 NOTE — PROGRESS NOTES
Pharmacy Note: Renal dose adjustment for Metoclopramide (Reglan)  Mahendra Plunkett has been prescribed Metoclopramide (Reglan) 10 mg every 8 hours as needed. Estimated Creatinine Clearance: 28.4 mL/min (A) (based on SCr of 1.35 mg/dL (H)).     Her calcula

## 2019-01-27 NOTE — CONSULTS
[facility]  Report of Consultation    Yael Rosas Patient Status:  Inpatient    1940 MRN [id number]   [facility] [place] 4NW-A Attending Luz Maria Wade MD   Hosp Day # 1 PCP Reyes Root MD     Reason for Consultation:    Ana Lilia Calles (PORCINE) drip 81196mnlxd/250mL infusion CONTINUOUS, 200-3,000 Units/hr, Intravenous, Continuous  •  CloNIDine HCl (CATAPRES) tab 0.1 mg, 0.1 mg, Oral, BID  •  carvedilol (COREG) tab 25 mg, 25 mg, Oral, BID with meals  •  AmLODIPine Besylate (NORVASC) tab kg/m²   HEENT: Oropharynx is clear. Neck: No palpable lymphadenopathy. Neck is supple. Chest: Clear to auscultation. No rales or wheezes. Heart: Regular rate and rhythm. Abdomen: Soft with good bowel sounds. Diffuse mild tenderness.   Extremities: Ped occlusive. Clinical correlation recommended. BILIARY:  Status post cholecystectomy. SPLEEN:  Unremarkable. PANCREAS:  Unremarkable. ADRENALS:  Unremarkable.   KIDNEYS:  Scattered subcentimeter hypodensities in both kidneys are too small to further ch colonic wall thickening is concerning for nonspecific colitis. Clinical correlation recommended. There is also nonspecific thickening of the distal esophagus, gastric wall, and duodenum that could be related to nonspecific gastroenteritis.   Clinical hal

## 2019-01-27 NOTE — PROGRESS NOTES
NURSING ADMISSION NOTE      Patient admitted via Cart  Oriented to room. Safety precautions initiated. Bed in low position. Call light in reach. Pt up to the floor from MRI, ER admission with c/o abdominal pain.  Abdomen soft, distended and tender,

## 2019-01-27 NOTE — PROGRESS NOTES
[facility]  Progress Note     Crow  Patient Status:  Inpatient    1940 MRN [id number]   [facility] 4NW-A Attending Heaven Sprague MD   Hosp Day # 1 PCP Maico Devi MD     Chief Complaint: Liver mets    S: Patient 100 mg Oral BID   • Insulin Aspart Pen  1-5 Units Subcutaneous TID CC and HS   • piperacillin-tazobactam  3.375 g Intravenous Q8H       ASSESSMENT / PLAN:     1.  Liver lesions, metastatic vs primary, AFP and  elevated, CEA negative, recent EGD and C

## 2019-01-27 NOTE — PROGRESS NOTES
Patient states that she has discomfort in her back and abdomen but declined pain medication. at this time denies c/o nausea abdomen distended . Family at bedside and reviewed the plan of care. remains npo at this time.

## 2019-01-27 NOTE — H&P
[facility]  History and Physical     Ana Nones Patient Status:  Emergency    1940 MRN [id number]   Location [address] [facility] Attending Nya Madrid MD   Hosp Day # 0 PCP Aguilar Gotti MD     Chief Complaint: by mouth daily. Disp: 90 tablet Rfl: 0   CloNIDine HCl 0.1 MG Oral Tab Take 1 tablet (0.1 mg total) by mouth 2 (two) times daily. Disp: 180 tablet Rfl: 0   HydrALAZINE HCl 100 MG Oral Tab Take 1 tablet (100 mg total) by mouth 2 (two) times daily.  Disp: 180 female. HEENT: Dry oral mucosa   Neck: No lymphadenopathy. No JVD. No carotid bruits. Respiratory: Clear to auscultation bilaterally. No wheezes. No rhonchi. Cardiovascular: S1, S2. Regular rate and rhythm. No murmurs, rubs or gallops. Equal pulses.    C by PCP could not give sample at that time  2. UA, Cx  3. Abx     Quality:  · DVT Prophylaxis: Hep Gtt   · CODE status: full  · Patel: no    Plan of care discussed with pt son, dtr in law- who translate for the patient.      Amelia Booth MD  1/26/2019

## 2019-01-28 ENCOUNTER — APPOINTMENT (OUTPATIENT)
Dept: ULTRASOUND IMAGING | Facility: HOSPITAL | Age: 79
DRG: 435 | End: 2019-01-28
Attending: INTERNAL MEDICINE
Payer: MEDICAID

## 2019-01-28 LAB
ALBUMIN SERPL-MCNC: 2.7 G/DL (ref 3.1–4.5)
ALBUMIN/GLOB SERPL: 0.6 {RATIO} (ref 1–2)
ALP LIVER SERPL-CCNC: 65 U/L (ref 55–142)
ALT SERPL-CCNC: 48 U/L (ref 14–54)
ANION GAP SERPL CALC-SCNC: 9 MMOL/L (ref 0–18)
APTT PPP: 183.5 SECONDS (ref 26.1–34.6)
APTT PPP: 46.9 SECONDS (ref 26.1–34.6)
AST SERPL-CCNC: 56 U/L (ref 15–41)
BASOPHILS # BLD AUTO: 0.02 X10(3) UL (ref 0–0.1)
BASOPHILS NFR BLD AUTO: 0.4 %
BILIRUB SERPL-MCNC: 0.6 MG/DL (ref 0.1–2)
BUN BLD-MCNC: 20 MG/DL (ref 8–20)
BUN/CREAT SERPL: 13.2 (ref 10–20)
CALCIUM BLD-MCNC: 8.4 MG/DL (ref 8.3–10.3)
CHLORIDE SERPL-SCNC: 108 MMOL/L (ref 101–111)
CO2 SERPL-SCNC: 22 MMOL/L (ref 22–32)
CREAT BLD-MCNC: 1.52 MG/DL (ref 0.55–1.02)
EOSINOPHIL # BLD AUTO: 0.03 X10(3) UL (ref 0–0.3)
EOSINOPHIL NFR BLD AUTO: 0.6 %
ERYTHROCYTE [DISTWIDTH] IN BLOOD BY AUTOMATED COUNT: 14 % (ref 11.5–16)
GLOBULIN PLAS-MCNC: 4.8 G/DL (ref 2.8–4.4)
GLUCOSE BLD-MCNC: 111 MG/DL (ref 65–99)
GLUCOSE BLD-MCNC: 113 MG/DL (ref 70–99)
GLUCOSE BLD-MCNC: 119 MG/DL (ref 65–99)
GLUCOSE BLD-MCNC: 129 MG/DL (ref 65–99)
GLUCOSE BLD-MCNC: 160 MG/DL (ref 65–99)
HCT VFR BLD AUTO: 33.2 % (ref 34–50)
HGB BLD-MCNC: 10.6 G/DL (ref 12–16)
IMMATURE GRANULOCYTE COUNT: 0.02 X10(3) UL (ref 0–1)
IMMATURE GRANULOCYTE RATIO %: 0.4 %
INR BLD: 1.07 (ref 0.9–1.1)
LYMPHOCYTES # BLD AUTO: 0.95 X10(3) UL (ref 0.9–4)
LYMPHOCYTES NFR BLD AUTO: 18.8 %
M PROTEIN MFR SERPL ELPH: 7.5 G/DL (ref 6.4–8.2)
MCH RBC QN AUTO: 26.1 PG (ref 27–33.2)
MCHC RBC AUTO-ENTMCNC: 31.9 G/DL (ref 31–37)
MCV RBC AUTO: 81.8 FL (ref 81–100)
MONOCYTES # BLD AUTO: 0.43 X10(3) UL (ref 0.1–1)
MONOCYTES NFR BLD AUTO: 8.5 %
NEUTROPHIL ABS PRELIM: 3.61 X10 (3) UL (ref 1.3–6.7)
NEUTROPHILS # BLD AUTO: 3.61 X10(3) UL (ref 1.3–6.7)
NEUTROPHILS NFR BLD AUTO: 71.3 %
OSMOLALITY SERPL CALC.SUM OF ELEC: 291 MOSM/KG (ref 275–295)
PLATELET # BLD AUTO: 148 10(3)UL (ref 150–450)
POTASSIUM SERPL-SCNC: 3.4 MMOL/L (ref 3.6–5.1)
PSA SERPL DL<=0.01 NG/ML-MCNC: 14.3 SECONDS (ref 12.4–14.7)
RBC # BLD AUTO: 4.06 X10(6)UL (ref 3.8–5.1)
RED CELL DISTRIBUTION WIDTH-SD: 41.3 FL (ref 35.1–46.3)
SODIUM SERPL-SCNC: 139 MMOL/L (ref 136–144)
WBC # BLD AUTO: 5.1 X10(3) UL (ref 4–13)

## 2019-01-28 PROCEDURE — BF45ZZZ ULTRASONOGRAPHY OF LIVER: ICD-10-PCS | Performed by: RADIOLOGY

## 2019-01-28 PROCEDURE — 76942 ECHO GUIDE FOR BIOPSY: CPT | Performed by: INTERNAL MEDICINE

## 2019-01-28 PROCEDURE — 99233 SBSQ HOSP IP/OBS HIGH 50: CPT | Performed by: HOSPITALIST

## 2019-01-28 PROCEDURE — 99152 MOD SED SAME PHYS/QHP 5/>YRS: CPT | Performed by: INTERNAL MEDICINE

## 2019-01-28 PROCEDURE — 0FB13ZX EXCISION OF RIGHT LOBE LIVER, PERCUTANEOUS APPROACH, DIAGNOSTIC: ICD-10-PCS | Performed by: RADIOLOGY

## 2019-01-28 PROCEDURE — 99231 SBSQ HOSP IP/OBS SF/LOW 25: CPT | Performed by: INTERNAL MEDICINE

## 2019-01-28 PROCEDURE — 47000 NEEDLE BIOPSY OF LIVER PERQ: CPT | Performed by: INTERNAL MEDICINE

## 2019-01-28 RX ORDER — POLYETHYLENE GLYCOL 3350 17 G/17G
17 POWDER, FOR SOLUTION ORAL DAILY
Status: DISCONTINUED | OUTPATIENT
Start: 2019-01-28 | End: 2019-01-29

## 2019-01-28 RX ORDER — MIDAZOLAM HYDROCHLORIDE 1 MG/ML
INJECTION INTRAMUSCULAR; INTRAVENOUS
Status: COMPLETED
Start: 2019-01-28 | End: 2019-01-28

## 2019-01-28 RX ORDER — FLUMAZENIL 0.1 MG/ML
INJECTION, SOLUTION INTRAVENOUS
Status: DISCONTINUED
Start: 2019-01-28 | End: 2019-01-28 | Stop reason: WASHOUT

## 2019-01-28 RX ORDER — NALOXONE HYDROCHLORIDE 0.4 MG/ML
INJECTION, SOLUTION INTRAMUSCULAR; INTRAVENOUS; SUBCUTANEOUS
Status: DISCONTINUED
Start: 2019-01-28 | End: 2019-01-28 | Stop reason: WASHOUT

## 2019-01-28 RX ORDER — FUROSEMIDE 20 MG/1
20 TABLET ORAL DAILY
Status: DISCONTINUED | OUTPATIENT
Start: 2019-01-28 | End: 2019-02-02

## 2019-01-28 NOTE — PAYOR COMM NOTE
--------------  ADMISSION REVIEW     Payor: Yoni Phelan #:  [id number]  Authorization Number: [id number]    Admit date: 1/26/19  Admit time: 2304       Admitting Physician: Macario Reeves MD  Attending Physician: O2 Device None (Room air)       Current:BP (!) 200/79   Pulse 82   Temp 97.5 °F (36.4 °C) (Temporal)   Resp 18   Ht 160 cm (5' 3\")   Wt 57.9 kg   SpO2 96%   BMI 22.61 kg/m²          Physical Exam    Alert and oriented patient appears no distress.   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       Medications Prescribed:  Current D /57 (BP Location: Right arm)   Pulse 71   Temp 97.6 °F (36.4 °C) (Oral)   Resp 18   Ht 63\"   Wt 127 lb 10.3 oz   SpO2 94%   BMI 22.61 kg/m²      General: NAD  Neck: No JVD  Lungs: CTA bilat  Heart: RRR, S1, S2  Abdomen: Soft, NT/ND, BS+x4  Extremiti carvedilol (COREG) tab 25 mg     Date Action Dose Route User    1/28/2019 0832 Given 25 mg Oral Ford Leon, RN    1/27/2019 2045 Given 25 mg Oral Valeriy More, RN      CloNIDine HCl (CATAPRES) tab 0.1 mg     Date Action Dose Route User    1/28/20 Pantoprazole Sodium (PROTONIX) 40 mg in Sodium Chloride 0.9 % 10 mL IV push     Date Action Dose Route User    1/28/2019 1246 Given 40 mg Intravenous Faby OCAMPO RN      Piperacillin Sod-Tazobactam So (ZOSYN) 3.375 g in dextrose 5 % 100 mL ADD-vant PLT  166.0  158.0   --   152.0   INR   --    --   1.07   --                Recent Labs   Lab  01/24/19   1422  01/26/19   1709  01/27/19   0825   GLU  140*  129*  133*   BUN  20  21*  19   CREATSERUM  1.17*  1.35*  1.46*   GFRAA  52*  43*  39*   GFRNAA  45 2. Monitor UOP, creatinine and electrolytes  10. Acute pyelonephritis  1. Zosyn  2. Follow-up UC     Quality:  · DVT Prophylaxis: Heparin     Estimated date of discharge: TBD  Discharge is dependent on: Clinical course  At this point Ms. Scarlet Arizmendi is expected CA  9.1  8.7  8.4   ALB  3.3  2.9*  2.7*   NA  129*  136  139   K  5.0  3.6  3.4*   CL  100*  105  108   CO2  21.0*  22.0  22.0   ALKPHO  101  80  65   AST  104*  70*  56*   ALT  71*  59*  48   BILT  0.7  0.9  0.6   TP  8.8*  7.6  7.5         Estimated Cre At this point Ms. Kiah Flowers is expected to be discharge to: [address] [place],9D of care discussed with patient, family and RN.  Moises Barnard MD

## 2019-01-28 NOTE — PROCEDURES
[facility]  Procedure Note    Wesley Restrepo Patient Status:  Inpatient    1940 MRN [id number]   [facility] 4NW-A Attending Bernadine Noyola MD   Hosp Day # 2 PCP Odalis Alcazar MD     Procedure: US guided liver core mass and nonf

## 2019-01-28 NOTE — PROGRESS NOTES
[facility]  Progress Note     Sintia Randell Patient Status:  Inpatient    1940 MRN [id number]   [facility] 4NW-A Attending Raeann Noyola MD   Hosp Day # 2 PCP Severa Boop, MD     Chief Complaint: Liver mets    S: Patient • furosemide  20 mg Oral Daily   • pantoprazole (PROTONIX) IV push  40 mg Intravenous Q24H   • lidocaine  1 patch Transdermal Q24H   • CloNIDine HCl  0.1 mg Oral BID   • carvedilol  25 mg Oral BID with meals   • AmLODIPine Besylate  5 mg Oral Daily   • H

## 2019-01-28 NOTE — PHYSICAL THERAPY NOTE
PHYSICAL THERAPY EVALUATION - INPATIENT     Room Number: 425/425-A  Evaluation Date: 1/28/2019  Type of Evaluation: Initial  Physician Order: PT Eval and Treat    Presenting Problem: Liver masses  Reason for Therapy: Mobility Dysfunction and Discharg Sebastian:  Pt is not primary [place] speaker, dtr acted as . Per pt's dtr pt is modified independent w/ ambulation using a straight cane and occasionally a RW. She receives assistance w/ all ADL from her dtr.     SUBJECTIVE  \"I'd like to go How much help from another person does the patient currently need. ..   -   Moving to and from a bed to a chair (including a wheelchair)?: A Little   -   Need to walk in hospital room?: A Little   -   Climbing 3-5 steps with a railing?: A Lot       AM-PAC mets, IVC thrombosis, DM 2, and HTN. In this PT evaluation, the patient presents with the following impairments decreased B LE strength and impaired balance.   Functional outcome measures completed include The AM-PAC '6-Clicks' Inpatient Basic Mobility Nela assist level: CGA   Goal #5    Goal #6    Goal Comments: Goals established on 1/28/2019

## 2019-01-28 NOTE — H&P
[address] Patient Status:  Inpatient    1940 MRN [id number]   [facility] 4NW-A Attending Bernadine Noyola MD   Hosp Day # 2 PCP Odalis Alcazar MD     Admitting Diagnosis:   Liver mass    His 135.3*  46.9*     Recent Labs   Lab  01/26/19   1709  01/27/19   0825  01/28/19   0604   GLU  129*  133*  113*   BUN  21*  19  20   CREATSERUM  1.35*  1.46*  1.52*   GFRAA  43*  39*  38*   GFRNAA  38*  34*  33*   CA  9.1  8.7  8.4   NA  129*  136  139   K

## 2019-01-28 NOTE — HOME CARE LIAISON
Referral received from [facility]. [facility] is able to accept the patient.     Liaison has met with patient and family at bedside and they are agreeable to home health for the following services:  RN PT OT     Current DME:  awa Jerry

## 2019-01-28 NOTE — PLAN OF CARE
NPO at midnight. Heparin drip stopped at 0400. Plan for [address] liver biopsy today. Pt denies pain. Ambulating to the bathroom unsteady gait. Needs 1 person assist. Call light in reach. Bed alarm in used. IVF continues. IV abx given. Daughter at the bedside.  No

## 2019-01-28 NOTE — CM/SW NOTE
01/28/19 1400   CM/SW Referral Data   Referral Source Social Work (self-referral)   Reason for Referral Discharge planning   Informant Patient   Patient Info   Patient's Mental Status Alert;Oriented   Patient's Home Environment House   Patient lives wit

## 2019-01-28 NOTE — IMAGING NOTE
Pt tolerated procedure well. Pressure applied to sites on r abd by Dr. Kemal Shabazz and [facility] tech then antibiotic ointment and tegaderms placed on biopsy sites r upper abd c,d,i. [facility]  Pt transported via bed by nursing staff with pt nelly

## 2019-01-28 NOTE — PROGRESS NOTES
Gastroenterology Progress Note  Rupert Going Patient Status:  Inpatient    1940 MRN [id number]   [facility] 4NW-A Attending Luciana Epley, MD   Hosp Day # 2 PCP Tamica Salmeron MD pain, early satiety, and wt loss found to have CT a/p suggesting liver mass s/p biopsy () and IVC thrombus. On exam, abd is currently soft, mildly distended, non-tender, and no overt signs of bleeding from bx site.  Will continue supportive care cheli

## 2019-01-28 NOTE — PROGRESS NOTES
Heme/Onc Progress Note - [facility]      Chief Complaint:    Follow up for liver mass. Interim History:      She has no new complaints. She is still very weak with intermittent nausea. Her daughter was at the bedside.     Physical Examination:    Vital Sig measures 0.7 cm. Cyst in the mid left kidney measures 1.6 cm. Cyst in the upper pole the left kidney measures 2.3 cm. .  Right kidney measures 10.0 cm. Left kidney measures 9.9 cm. AORTA/IVC:  Distal aorta not visualized. Thrombus in the IVC is noted.

## 2019-01-28 NOTE — PROGRESS NOTES
Dressing x2 sites under right side under breast d/i states that her pain is controlled at this time. Up ambulating with physical therapy using a walker . explained the plan of care with patient and her daughter.

## 2019-01-28 NOTE — IMAGING NOTE
Hiliary, RN asked when to resume Heparin. Phoned Dr. Juancarlos Jean Baptiste, orders rec'd to start in Heparin in 6 hours. Relayed information to Hossein Elizabeth RN.

## 2019-01-28 NOTE — PROGRESS NOTES
Alert and oriented, non english speaking but able to make basic needs known, family remains at bedside, V.s.s., denies pain this shift. Continues on Heparin GTT at 10 cc/hr, now therapeutic, next PTT in the morning.  Pt is to be NPO after midnight for liver

## 2019-01-29 LAB
ALBUMIN SERPL-MCNC: 2.7 G/DL (ref 3.1–4.5)
ALBUMIN/GLOB SERPL: 0.6 {RATIO} (ref 1–2)
ALP LIVER SERPL-CCNC: 64 U/L (ref 55–142)
ALT SERPL-CCNC: 47 U/L (ref 14–54)
ANION GAP SERPL CALC-SCNC: 10 MMOL/L (ref 0–18)
APTT PPP: 204.8 SECONDS (ref 26.1–34.6)
APTT PPP: 74.3 SECONDS (ref 26.1–34.6)
AST SERPL-CCNC: 65 U/L (ref 15–41)
BASOPHILS # BLD AUTO: 0.02 X10(3) UL (ref 0–0.1)
BASOPHILS NFR BLD AUTO: 0.3 %
BILIRUB SERPL-MCNC: 0.6 MG/DL (ref 0.1–2)
BUN BLD-MCNC: 17 MG/DL (ref 8–20)
BUN/CREAT SERPL: 12.4 (ref 10–20)
CALCIUM BLD-MCNC: 8.7 MG/DL (ref 8.3–10.3)
CHLORIDE SERPL-SCNC: 108 MMOL/L (ref 101–111)
CO2 SERPL-SCNC: 22 MMOL/L (ref 22–32)
CREAT BLD-MCNC: 1.37 MG/DL (ref 0.55–1.02)
EOSINOPHIL # BLD AUTO: 0.08 X10(3) UL (ref 0–0.3)
EOSINOPHIL NFR BLD AUTO: 1.4 %
ERYTHROCYTE [DISTWIDTH] IN BLOOD BY AUTOMATED COUNT: 14.4 % (ref 11.5–16)
GLOBULIN PLAS-MCNC: 4.9 G/DL (ref 2.8–4.4)
GLUCOSE BLD-MCNC: 125 MG/DL (ref 70–99)
GLUCOSE BLD-MCNC: 126 MG/DL (ref 65–99)
GLUCOSE BLD-MCNC: 131 MG/DL (ref 65–99)
GLUCOSE BLD-MCNC: 181 MG/DL (ref 65–99)
GLUCOSE BLD-MCNC: 193 MG/DL (ref 65–99)
HCT VFR BLD AUTO: 33.8 % (ref 34–50)
HGB BLD-MCNC: 10.9 G/DL (ref 12–16)
IMMATURE GRANULOCYTE COUNT: 0.01 X10(3) UL (ref 0–1)
IMMATURE GRANULOCYTE RATIO %: 0.2 %
LYMPHOCYTES # BLD AUTO: 1.09 X10(3) UL (ref 0.9–4)
LYMPHOCYTES NFR BLD AUTO: 18.6 %
M PROTEIN MFR SERPL ELPH: 7.6 G/DL (ref 6.4–8.2)
MCH RBC QN AUTO: 26.4 PG (ref 27–33.2)
MCHC RBC AUTO-ENTMCNC: 32.2 G/DL (ref 31–37)
MCV RBC AUTO: 81.8 FL (ref 81–100)
MONOCYTES # BLD AUTO: 0.64 X10(3) UL (ref 0.1–1)
MONOCYTES NFR BLD AUTO: 10.9 %
NEUTROPHIL ABS PRELIM: 4.02 X10 (3) UL (ref 1.3–6.7)
NEUTROPHILS # BLD AUTO: 4.02 X10(3) UL (ref 1.3–6.7)
NEUTROPHILS NFR BLD AUTO: 68.6 %
OSMOLALITY SERPL CALC.SUM OF ELEC: 293 MOSM/KG (ref 275–295)
PLATELET # BLD AUTO: 145 10(3)UL (ref 150–450)
POTASSIUM SERPL-SCNC: 3 MMOL/L (ref 3.6–5.1)
POTASSIUM SERPL-SCNC: 3 MMOL/L (ref 3.6–5.1)
POTASSIUM SERPL-SCNC: 3.5 MMOL/L (ref 3.6–5.1)
RBC # BLD AUTO: 4.13 X10(6)UL (ref 3.8–5.1)
RED CELL DISTRIBUTION WIDTH-SD: 41.9 FL (ref 35.1–46.3)
SODIUM SERPL-SCNC: 140 MMOL/L (ref 136–144)
WBC # BLD AUTO: 5.9 X10(3) UL (ref 4–13)

## 2019-01-29 PROCEDURE — 99233 SBSQ HOSP IP/OBS HIGH 50: CPT | Performed by: INTERNAL MEDICINE

## 2019-01-29 PROCEDURE — 99233 SBSQ HOSP IP/OBS HIGH 50: CPT | Performed by: HOSPITALIST

## 2019-01-29 RX ORDER — POLYETHYLENE GLYCOL 3350 17 G/17G
17 POWDER, FOR SOLUTION ORAL DAILY PRN
Status: DISCONTINUED | OUTPATIENT
Start: 2019-01-29 | End: 2019-02-02

## 2019-01-29 RX ORDER — PANTOPRAZOLE SODIUM 40 MG/1
40 TABLET, DELAYED RELEASE ORAL
Status: DISCONTINUED | OUTPATIENT
Start: 2019-01-29 | End: 2019-01-30

## 2019-01-29 RX ORDER — FUROSEMIDE 20 MG/1
TABLET ORAL
Qty: 30 TABLET | Refills: 0 | Status: SHIPPED | OUTPATIENT
Start: 2019-01-29 | End: 2019-02-19

## 2019-01-29 RX ORDER — POTASSIUM CHLORIDE 14.9 MG/ML
20 INJECTION INTRAVENOUS ONCE
Status: COMPLETED | OUTPATIENT
Start: 2019-01-29 | End: 2019-01-29

## 2019-01-29 RX ORDER — POTASSIUM CHLORIDE 20 MEQ/1
40 TABLET, EXTENDED RELEASE ORAL EVERY 4 HOURS
Status: COMPLETED | OUTPATIENT
Start: 2019-01-29 | End: 2019-01-30

## 2019-01-29 NOTE — PROGRESS NOTES
Heme/Onc Progress Note - [facility]      Chief Complaint:    Follow up for liver mass. Interim History: The liver biopsy shows hepatocellular carcinoma. The non-involved liver shows hepatitis. The Hep C study is pending.  She continues to have intermi mm.  Sequelae of cholecystectomy is noted. PANCREAS:  Normal.  SPLEEN:  Normal.  KIDNEYS:  Lower pole cyst in the right kidney measures 0.7 cm. Cyst in the mid left kidney measures 1.6 cm. Cyst in the upper pole the left kidney measures 2.3 cm. .  Right is very weak and the family is concerned that they will have difficulty caring for her. She might not be strong enough for treatment options and palliative care will need to be considered.     Emerson Mcclain MD  [facility]

## 2019-01-29 NOTE — PROGRESS NOTES
Heme/Onc Progress Note       Chief Complaint:    Follow up for liver mass. Interim History:      She has no new complaints. She is still very weak with intermittent nausea/abdominal discomfort.     Physical Examination:    Vital Signs: BP (!) 177/72 measures 0.7 cm. Cyst in the mid left kidney measures 1.6 cm. Cyst in the upper pole the left kidney measures 2.3 cm. .  Right kidney measures 10.0 cm. Left kidney measures 9.9 cm. AORTA/IVC:  Distal aorta not visualized. Thrombus in the IVC is noted.

## 2019-01-29 NOTE — PROGRESS NOTES
[facility]  Progress Note     Leafy Osler Patient Status:  Inpatient    1940 MRN [id number]   [facility] 4NW-A Attending Viri Maya MD   Hosp Day # 3 PCP Nona Perez MD     Chief Complaint: Liver mets    S: Patient hours.         Imaging: Imaging data reviewed in Epic.     Medications:   • potassium chloride 40mEq IVPB (peripheral line)  40 mEq Intravenous Once    Followed by   • potassium chloride  20 mEq Intravenous Once   • furosemide  20 mg Oral Daily   • PEG 3350

## 2019-01-29 NOTE — PLAN OF CARE
GASTROINTESTINAL - ADULT    • Minimal or absence of nausea and vomiting Progressing    • Maintains adequate nutritional intake (undernourished) Progressing    • Maintains or returns to baseline bowel function Progressing        GENITOURINARY - ADULT    • A

## 2019-01-29 NOTE — TELEPHONE ENCOUNTER
Approved per protocol  Furosemide  Last OV relevant to medication: 3-13-18  Last refill date: 12/31/18   #/refills: 0  When pt was asked to return for OV: 3 months  Upcoming appt/reason: none  Recent labs: 01-29-19: PTT/ CBC/ Potassium

## 2019-01-30 ENCOUNTER — APPOINTMENT (OUTPATIENT)
Dept: MRI IMAGING | Facility: HOSPITAL | Age: 79
DRG: 435 | End: 2019-01-30
Attending: INTERNAL MEDICINE
Payer: MEDICAID

## 2019-01-30 ENCOUNTER — APPOINTMENT (OUTPATIENT)
Dept: GENERAL RADIOLOGY | Facility: HOSPITAL | Age: 79
DRG: 435 | End: 2019-01-30
Attending: INTERNAL MEDICINE
Payer: MEDICAID

## 2019-01-30 LAB
ANION GAP SERPL CALC-SCNC: 6 MMOL/L (ref 0–18)
APTT PPP: 83 SECONDS (ref 26.1–34.6)
BASOPHILS # BLD AUTO: 0.01 X10(3) UL (ref 0–0.2)
BASOPHILS NFR BLD AUTO: 0.2 %
BUN BLD-MCNC: 16 MG/DL (ref 8–20)
BUN/CREAT SERPL: 12.3 (ref 10–20)
CALCIUM BLD-MCNC: 8.7 MG/DL (ref 8.3–10.3)
CHLORIDE SERPL-SCNC: 108 MMOL/L (ref 101–111)
CO2 SERPL-SCNC: 25 MMOL/L (ref 22–32)
CREAT BLD-MCNC: 1.3 MG/DL (ref 0.55–1.02)
DEPRECATED RDW RBC AUTO: 42.5 FL (ref 35.1–46.3)
EOSINOPHIL # BLD AUTO: 0.07 X10(3) UL (ref 0–0.7)
EOSINOPHIL NFR BLD AUTO: 1.3 %
ERYTHROCYTE [DISTWIDTH] IN BLOOD BY AUTOMATED COUNT: 14.5 % (ref 11–15)
F-ACTIN (SMOOTH MUSCLE) AB: 102 UNITS
GLUCOSE BLD-MCNC: 124 MG/DL (ref 65–99)
GLUCOSE BLD-MCNC: 138 MG/DL (ref 65–99)
GLUCOSE BLD-MCNC: 140 MG/DL (ref 65–99)
GLUCOSE BLD-MCNC: 151 MG/DL (ref 70–99)
GLUCOSE BLD-MCNC: 154 MG/DL (ref 65–99)
HCT VFR BLD AUTO: 34 % (ref 35–48)
HGB BLD-MCNC: 11.1 G/DL (ref 12–16)
IMM GRANULOCYTES # BLD AUTO: 0.01 X10(3) UL (ref 0–1)
IMM GRANULOCYTES NFR BLD: 0.2 %
LYMPHOCYTES # BLD AUTO: 1.18 X10(3) UL (ref 1–4)
LYMPHOCYTES NFR BLD AUTO: 21.5 %
MCH RBC QN AUTO: 26.7 PG (ref 26–34)
MCHC RBC AUTO-ENTMCNC: 32.6 G/DL (ref 31–37)
MCV RBC AUTO: 81.9 FL (ref 80–100)
MONOCYTES # BLD AUTO: 0.56 X10(3) UL (ref 0.1–1)
MONOCYTES NFR BLD AUTO: 10.2 %
NEUTROPHILS # BLD AUTO: 3.66 X10 (3) UL (ref 1.5–7.7)
NEUTROPHILS # BLD AUTO: 3.66 X10(3) UL (ref 1.5–7.7)
NEUTROPHILS NFR BLD AUTO: 66.6 %
OSMOLALITY SERPL CALC.SUM OF ELEC: 292 MOSM/KG (ref 275–295)
PLATELET # BLD AUTO: 141 10(3)UL (ref 150–450)
POTASSIUM SERPL-SCNC: 3.9 MMOL/L (ref 3.6–5.1)
POTASSIUM SERPL-SCNC: 3.9 MMOL/L (ref 3.6–5.1)
RBC # BLD AUTO: 4.15 X10(6)UL (ref 3.8–5.3)
SODIUM SERPL-SCNC: 139 MMOL/L (ref 136–144)
WBC # BLD AUTO: 5.5 X10(3) UL (ref 4–11)

## 2019-01-30 PROCEDURE — 99233 SBSQ HOSP IP/OBS HIGH 50: CPT | Performed by: INTERNAL MEDICINE

## 2019-01-30 PROCEDURE — 74183 MRI ABD W/O CNTR FLWD CNTR: CPT | Performed by: INTERNAL MEDICINE

## 2019-01-30 PROCEDURE — 99232 SBSQ HOSP IP/OBS MODERATE 35: CPT | Performed by: HOSPITALIST

## 2019-01-30 PROCEDURE — 71045 X-RAY EXAM CHEST 1 VIEW: CPT | Performed by: INTERNAL MEDICINE

## 2019-01-30 RX ORDER — SUCRALFATE ORAL 1 G/10ML
1 SUSPENSION ORAL
Qty: 420 ML | Refills: 0 | Status: SHIPPED | OUTPATIENT
Start: 2019-01-30 | End: 2019-02-02

## 2019-01-30 RX ORDER — CIPROFLOXACIN 500 MG/1
500 TABLET, FILM COATED ORAL EVERY 12 HOURS SCHEDULED
Status: COMPLETED | OUTPATIENT
Start: 2019-01-30 | End: 2019-02-01

## 2019-01-30 RX ORDER — SUCRALFATE ORAL 1 G/10ML
1 SUSPENSION ORAL
Status: DISCONTINUED | OUTPATIENT
Start: 2019-01-30 | End: 2019-02-02

## 2019-01-30 RX ORDER — PANTOPRAZOLE SODIUM 40 MG/1
40 TABLET, DELAYED RELEASE ORAL
Status: DISCONTINUED | OUTPATIENT
Start: 2019-01-30 | End: 2019-02-02

## 2019-01-30 RX ORDER — PANTOPRAZOLE SODIUM 40 MG/1
40 TABLET, DELAYED RELEASE ORAL
Qty: 60 TABLET | Refills: 0 | Status: SHIPPED | OUTPATIENT
Start: 2019-01-30 | End: 2019-02-02

## 2019-01-30 NOTE — PHYSICAL THERAPY NOTE
IP PT attempted, per RN, pt is nauseous, receiving meds currently. Will re-attempt as schedule permits.

## 2019-01-30 NOTE — PROGRESS NOTES
[facility]  Progress Note     Sintia Mejias Patient Status:  Inpatient    1940 MRN [id number]   [facility] 4NW-A Attending Raeann Noyola MD   Hosp Day # 4 PCP Severa Boop, MD     Chief Complaint: [address] IVC thrombus    S: P (H)).    Recent Labs   Lab  01/26/19   2334  01/28/19   0752   PTP  14.3  14.3   INR  1.07  1.07       No results for input(s): TROP, CK in the last 168 hours. Imaging: Imaging data reviewed in Epic.     Medications:   • sucralfate  1 g Oral TID AC

## 2019-01-30 NOTE — PROGRESS NOTES
Heme/Onc Progress Note - [facility]      Chief Complaint:    Follow up for liver mass. Interim History:      The patient had an MRI of the abdomen. This again showed the large left liver HCC mass. There was tumor involvement in the IVC and portal vein.  The right portal vein, left portal vein, and within the intrahepatic IVC. 2. Cirrhotic liver morphology. 3. 7 mm T2 hyperintense nonenhancing focus within the head of the pancreas may represent a pseudocyst, IPMN, or cystic mucinous neoplasm.   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 She has poor performance status. Poor PO intake. She has portal vein involvement.  Y90

## 2019-01-30 NOTE — PLAN OF CARE
GASTROINTESTINAL - ADULT    • Minimal or absence of nausea and vomiting Progressing    • Maintains adequate nutritional intake (undernourished) Progressing    • Maintains or returns to baseline bowel function Progressing        Impaired Functional Mobility

## 2019-01-31 ENCOUNTER — TELEPHONE (OUTPATIENT)
Dept: INTERNAL MEDICINE CLINIC | Facility: CLINIC | Age: 79
End: 2019-01-31

## 2019-01-31 PROBLEM — Z71.89 COUNSELING REGARDING ADVANCE CARE PLANNING AND GOALS OF CARE: Status: ACTIVE | Noted: 2019-01-31

## 2019-01-31 PROBLEM — Z51.5 PALLIATIVE CARE ENCOUNTER: Status: ACTIVE | Noted: 2019-01-31

## 2019-01-31 LAB
ANION GAP SERPL CALC-SCNC: 8 MMOL/L (ref 0–18)
APTT PPP: 101.2 SECONDS (ref 26.1–34.6)
BUN BLD-MCNC: 12 MG/DL (ref 8–20)
BUN/CREAT SERPL: 12.6 (ref 10–20)
C DIFF TOX B STL QL: NEGATIVE
CALCIUM BLD-MCNC: 9 MG/DL (ref 8.3–10.3)
CHLORIDE SERPL-SCNC: 106 MMOL/L (ref 101–111)
CO2 SERPL-SCNC: 27 MMOL/L (ref 22–32)
CREAT BLD-MCNC: 0.95 MG/DL (ref 0.55–1.02)
GLUCOSE BLD-MCNC: 127 MG/DL (ref 65–99)
GLUCOSE BLD-MCNC: 128 MG/DL (ref 65–99)
GLUCOSE BLD-MCNC: 129 MG/DL (ref 65–99)
GLUCOSE BLD-MCNC: 132 MG/DL (ref 70–99)
GLUCOSE BLD-MCNC: 207 MG/DL (ref 65–99)
HAV IGM SER QL: 1.6 MG/DL (ref 1.8–2.5)
OSMOLALITY SERPL CALC.SUM OF ELEC: 294 MOSM/KG (ref 275–295)
POTASSIUM SERPL-SCNC: 3 MMOL/L (ref 3.6–5.1)
POTASSIUM SERPL-SCNC: 4.1 MMOL/L (ref 3.6–5.1)
SODIUM SERPL-SCNC: 141 MMOL/L (ref 136–144)

## 2019-01-31 PROCEDURE — 99357 PROLONGED SERV,INPATIENT,EA ADD 1/2: CPT | Performed by: HOSPITALIST

## 2019-01-31 PROCEDURE — 99232 SBSQ HOSP IP/OBS MODERATE 35: CPT | Performed by: HOSPITALIST

## 2019-01-31 PROCEDURE — 99232 SBSQ HOSP IP/OBS MODERATE 35: CPT | Performed by: INTERNAL MEDICINE

## 2019-01-31 PROCEDURE — 99356 PROLONGED SERV,INPATIENT,1ST HR: CPT | Performed by: HOSPITALIST

## 2019-01-31 RX ORDER — MAGNESIUM OXIDE 400 MG (241.3 MG MAGNESIUM) TABLET
400 TABLET ONCE
Status: COMPLETED | OUTPATIENT
Start: 2019-01-31 | End: 2019-01-31

## 2019-01-31 RX ORDER — SCOLOPAMINE TRANSDERMAL SYSTEM 1 MG/1
1 PATCH, EXTENDED RELEASE TRANSDERMAL
Status: DISCONTINUED | OUTPATIENT
Start: 2019-01-31 | End: 2019-02-02

## 2019-01-31 RX ORDER — POTASSIUM CHLORIDE 20 MEQ/1
40 TABLET, EXTENDED RELEASE ORAL EVERY 4 HOURS
Status: COMPLETED | OUTPATIENT
Start: 2019-01-31 | End: 2019-01-31

## 2019-01-31 NOTE — PROGRESS NOTES
[facility]  Progress Note    Felicita Baldwin Patient Status:  Inpatient    1940 MRN [id number]   [facility] 4NW-A Attending Mack Mahajan MD   Date 2019 PCP Heather Osorio MD     Subjective:   Felicita Baldwin is a(n 67 ye vomiting  Dysphagia  HCC  Hep C  Diarrhea    Plan:  Esophagogram, per family requested non invasive testing for now  Zofran as needed  C difficile testing  Empiric PPI  Soft diet as tolerated    Total time spent in the care of the patient today: 25 minutes

## 2019-01-31 NOTE — PROGRESS NOTES
[facility]  Progress Note     Prettyniharika Lisa Patient Status:  Inpatient    1940 MRN [id number]   [facility] 4NW-A Attending Jenna Bettencourt MD   Hosp Day # 5 PCP Feli Crum MD     Chief Complaint: [address] IVC thrombus    S: P mL/min (based on SCr of 0.95 mg/dL). Recent Labs   Lab  01/26/19   2334  01/28/19   0752   PTP  14.3  14.3   INR  1.07  1.07       No results for input(s): TROP, CK in the last 168 hours. Imaging: Imaging data reviewed in Epic.     Medications: it believed that patient knows what cancer is. Patient does not have insight into diagnosis nor management/treatment options. Son and daughter would be deciding for patient.   Daughter is very much on board with conservative approach, focusing on quality

## 2019-01-31 NOTE — PLAN OF CARE
GASTROINTESTINAL - ADULT    • Minimal or absence of nausea and vomiting Not Progressing    • Maintains adequate nutritional intake (undernourished) Not Progressing    • Maintains or returns to baseline bowel function Not Progressing          PAIN - ADULT

## 2019-01-31 NOTE — TELEPHONE ENCOUNTER
[facility] calling to see if Dr Solis De Jesus will sign off for this patient's discharge from home health?   Please call to let her know

## 2019-01-31 NOTE — CM/SW NOTE
SW received an order that stated determine hospice vs PC at either home or a facility. SW spoke w/RN about this. Will consult PC to have discussion w/the pt and her family.

## 2019-01-31 NOTE — PHYSICAL THERAPY NOTE
PHYSICAL THERAPY TREATMENT NOTE - INPATIENT    Room Number: 425/425-A     Session: 2   Number of Visits to Meet Established Goals: 7    Presenting Problem: Liver masses    Problem List  Principal Problem:    Liver masses  Active Problems:    Hyponatremia wheelchair)?: A Little   -   Need to walk in hospital room?: A Little   -   Climbing 3-5 steps with a railing?: A Lot       AM-PAC Score:  Raw Score: 17   Approx Degree of Impairment: 50.57%   Standardized Score (AM-PAC Scale): 42.13   CMS Modifier (G-Code Currently pt requires sba assist w/ sit to stand using a RW and sba assist w/ ambulation using a RW. Pt will benefit from skilled IPPT in order to address impairments listed above and facilitate return to prior level of function.  Therapist discussed Shanon Bundy

## 2019-02-01 ENCOUNTER — APPOINTMENT (OUTPATIENT)
Dept: GENERAL RADIOLOGY | Facility: HOSPITAL | Age: 79
DRG: 435 | End: 2019-02-01
Attending: NURSE PRACTITIONER
Payer: MEDICAID

## 2019-02-01 LAB
APTT PPP: 117.7 SECONDS (ref 26.1–34.6)
APTT PPP: 224 SECONDS (ref 26.1–34.6)
APTT PPP: 69.7 SECONDS (ref 26.1–34.6)
GLUCOSE BLD-MCNC: 118 MG/DL (ref 65–99)
GLUCOSE BLD-MCNC: 157 MG/DL (ref 65–99)
GLUCOSE BLD-MCNC: 159 MG/DL (ref 65–99)
GLUCOSE BLD-MCNC: 169 MG/DL (ref 65–99)
HAV IGM SER QL: 1.8 MG/DL (ref 1.8–2.5)

## 2019-02-01 PROCEDURE — 99232 SBSQ HOSP IP/OBS MODERATE 35: CPT | Performed by: INTERNAL MEDICINE

## 2019-02-01 PROCEDURE — 99253 IP/OBS CNSLTJ NEW/EST LOW 45: CPT | Performed by: CLINICAL NURSE SPECIALIST

## 2019-02-01 PROCEDURE — 99232 SBSQ HOSP IP/OBS MODERATE 35: CPT | Performed by: HOSPITALIST

## 2019-02-01 PROCEDURE — 74220 X-RAY XM ESOPHAGUS 1CNTRST: CPT | Performed by: NURSE PRACTITIONER

## 2019-02-01 RX ORDER — MAGNESIUM OXIDE 400 MG (241.3 MG MAGNESIUM) TABLET
400 TABLET ONCE
Status: COMPLETED | OUTPATIENT
Start: 2019-02-01 | End: 2019-02-01

## 2019-02-01 RX ORDER — DIPHENHYDRAMINE HCL 25 MG
25 CAPSULE ORAL ONCE
Status: DISCONTINUED | OUTPATIENT
Start: 2019-02-01 | End: 2019-02-02

## 2019-02-01 NOTE — PROGRESS NOTES
Gastroenterology Progress Note  Jose Rich Patient Status:  Inpatient    1940 MRN [id number]   [facility] 4NW-A Attending Ananth Cunningham [place] [address] [address] Day # 6 PCP Sofía Garcia, was performed with fluoroscopy in the usual manner. COMPARISON:  None.      INDICATIONS:  nausea, vomiting, dysphagia     PATIENT STATED HISTORY: (As transcribed by Technologist)  Patient' family states she has had difficulty swallowing, loss of appetit vomiting: improved today, esophogram without reflux, ulcers, neoplasm   Dysphagia: Family declining EGD; no difficulty today and Esophogram today without stricture, ulceration  HCC  Hep C  Diarrhea: C Diff negative; no BMs today    Plan:   1.  Continue PPI

## 2019-02-01 NOTE — CM/SW NOTE
SW spoke w/Tiffany Portillo regarding PC at [facility]. Paged [facility] to add this service since [facility]. Will follow up if any other needs arise.

## 2019-02-01 NOTE — HOME CARE LIAISON
I spoke with daughter Naima Roche about Palliative care at home and what that looks like. Confirmed with PCP Crystal that he will follow for home health.

## 2019-02-01 NOTE — PROGRESS NOTES
Heme/Onc Progress Note - [facility]      Chief Complaint:    Follow up for liver mass. Interim History:      The patient is more comfortable this morning. She has less diarrhea. The C diff was negative. She has no nausea this morning.  She was able to eat a pancreas may represent a pseudocyst, IPMN, or cystic mucinous neoplasm. This can be further assessed on followup imaging. 4. Small right and trace left pleural effusions.     Physical Examination:    Vital Signs: BP (!) 163/58 (BP Location: Right arm)   P this interval not displayed. 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  Rafael Flavin

## 2019-02-01 NOTE — TELEPHONE ENCOUNTER
I attempted to call Selvin Escalante with [facility] back but there no answer and no VM to . Will try again later.

## 2019-02-01 NOTE — PLAN OF CARE
GASTROINTESTINAL - ADULT    • Minimal or absence of nausea and vomiting Not Progressing    • Maintains adequate nutritional intake (undernourished) Not Progressing    • Maintains or returns to baseline bowel function Not Progressing          GENITOURINARY

## 2019-02-01 NOTE — PROGRESS NOTES
[facility]  Progress Note     Hansfordheidy Ramirez Patient Status:  Inpatient    1940 MRN [id number]   [facility] 4NW-A Attending Shikha Dameron Hospital Day # 6 PCP Debra Caro MD   Chief Complaint: [address] IVC thrombus 3. 0*  3.0*   < >  3.9  3.9  3.0*  4.1   CL  105  108  108   --   108  106   --    CO2  22.0  22.0  22.0   --   25.0  27.0   --    ALKPHO  80  65  64   --    --    --    --    AST  70*  56*  65*   --    --    --    --    ALT  59*  48  47   --    --    -- resolved  1. Monitor UOP, creatinine and electrolytes  12. Acute pyelonephritis -UC negative, abx initiated prior to sample being collected as outpatient  1. Complete Cipro as ordered  13.  Constipation, resolved      Plan of care: Care conference this afte

## 2019-02-01 NOTE — PAYOR COMM NOTE
-------------[facility]3]---------------------    REQUESTING ADDITIONAL DAYS 1/31 AND 2/1    CONTINUED STAY REVIEW    Payor: Yoni Phelan #:  [id number]  Authorization Number: [id number]    Admit date: 1/26/19  A magnesium oxide (MAG-OX) tab 400 mg     Date Action Dose Route User    2/1/2019 1126 Given 400 mg Oral Marcie Galicia RN      Pantoprazole Sodium (PROTONIX) EC tab 40 mg     Date Action Dose Route User    1/31/2019 1734 Given 40 mg Oral Marylou Rolla, [place] INR   --   1.07   --    --   1.07   --    --                   Recent Labs   Lab  01/27/19   0825  01/28/19   0604  01/29/19   0615  01/29/19   1817  01/30/19   0602  01/31/19   0618   GLU  133*  113*  125*   --   151*  132*   BUN  19  20  17   --   16  12 1. Heparin drip > Lovenox on discharge  4. Gastritis  5. Esophagitis  1. PPI  2. Carafate  3. Add Scopolamine patch  6. Lung lesion  1. Outpatient follow-up  7. LS back pain s/t spinal stenosis, no metastatic lesions  1. Analgesics as needed  8.  Diabetes m > 90 minutes spent in goals of care conversation     PTailor MD                     2/1  Ashtyn Duffy DO   Physician   Hospitalist   Progress Notes   Signed   Date of Service:  2/1/2019 12:59 PM               [address] CREATSERUM  1.46*  1.52*  1.37*   --   1.30*  0.95   --    GFRAA  39*  38*  43*   --   45*  66   --    GFRNAA  34*  33*  37*   --   39*  58*   --    CA  8.7  8.4  8.7   --   8.7  9.0   --    ALB  2.9*  2.7*  2.7*   --    --    --    --    NA  136  139  140 1. Outpatient follow-up  7. LS back pain s/t spinal stenosis, no metastatic lesions  1. Analgesics as needed  8. Diabetes mellitus  1. Correctional scale  2. Accuchecks  9. Essential hypertension  1. Norvasc, Coreg, Clonidine, Hydralazine and Lasix  2.  Mon

## 2019-02-01 NOTE — PROGRESS NOTES
Alert and oriented, non english speaking, family remains at bedside, ambulatory to bathroom with standby assist, continues on heparin gtt, levels therapeutic, daily recheck, denies pain or nausea this shift.  Care conference scheduled for this afternoon, fa

## 2019-02-01 NOTE — CONSULTS
[address]  [id number]  Hospital Day #6  Date of Consult: 02/01/19  Patient seen at: [facility]    Reason for Consultation: Consult requested by Dr. Jean-Claude Wilhelm for evaluation of palliative c (TRANSDERM-SCOP) patch 1 patch Transdermal Q72H   sucralfate (CARAFATE) 1 GM/10ML suspension 1 g 1 g Oral TID AC and HS   Pantoprazole Sodium (PROTONIX) EC tab 40 mg 40 mg Oral BID AC   Ciprofloxacin HCl (CIPRO) tab 500 mg 500 mg Oral 2 times per day   PEG denies  Cough: denies  Nausea: denies, states appetite is better, ate well today.    Pain: denies    Objective/Physical Exam:  Vital Signs: /59 (BP Location: Right arm)   Pulse 71   Temp 98.3 °F (36.8 °C) (Oral)   Resp 16   Ht 5' 3\" (1.6 m)   Wt 127 requested to continue a FULL CODE status and preferred to not have me discuss resuscitation with Francisco Evangelista at this time. I provided them with a copy of the POLST form and explained the parts in detail.  They stated they would prefer to discuss the options with counseling and coordinating care. Thank you for allowing the Palliative Care Team to participate in the care of your patient. Dr. Arminda Rose updated on above via [employer] message.        ANUJ Jovel  Palliative Care  Phone [phone]  2/1/2019  3:

## 2019-02-02 VITALS
DIASTOLIC BLOOD PRESSURE: 52 MMHG | TEMPERATURE: 98 F | BODY MASS INDEX: 22.61 KG/M2 | HEART RATE: 69 BPM | HEIGHT: 63 IN | WEIGHT: 127.63 LBS | SYSTOLIC BLOOD PRESSURE: 125 MMHG | OXYGEN SATURATION: 95 % | RESPIRATION RATE: 16 BRPM

## 2019-02-02 LAB
APTT PPP: 59.1 SECONDS (ref 26.1–34.6)
GLUCOSE BLD-MCNC: 137 MG/DL (ref 65–99)
GLUCOSE BLD-MCNC: 167 MG/DL (ref 65–99)
HAV IGM SER QL: 1.8 MG/DL (ref 1.8–2.5)

## 2019-02-02 PROCEDURE — 99232 SBSQ HOSP IP/OBS MODERATE 35: CPT | Performed by: INTERNAL MEDICINE

## 2019-02-02 PROCEDURE — 99239 HOSP IP/OBS DSCHRG MGMT >30: CPT | Performed by: HOSPITALIST

## 2019-02-02 RX ORDER — DIPHENHYDRAMINE HCL 25 MG
25 CAPSULE ORAL EVERY 6 HOURS PRN
Status: DISCONTINUED | OUTPATIENT
Start: 2019-02-02 | End: 2019-02-02

## 2019-02-02 RX ORDER — ENOXAPARIN SODIUM 100 MG/ML
80 INJECTION SUBCUTANEOUS DAILY
Status: DISCONTINUED | OUTPATIENT
Start: 2019-02-02 | End: 2019-02-02

## 2019-02-02 RX ORDER — PANTOPRAZOLE SODIUM 40 MG/1
40 TABLET, DELAYED RELEASE ORAL
Qty: 60 TABLET | Refills: 0 | Status: SHIPPED | OUTPATIENT
Start: 2019-02-02 | End: 2019-04-24 | Stop reason: ALTCHOICE

## 2019-02-02 RX ORDER — ENOXAPARIN SODIUM 100 MG/ML
80 INJECTION SUBCUTANEOUS DAILY
Qty: 30 SYRINGE | Refills: 2 | Status: SHIPPED | OUTPATIENT
Start: 2019-02-02 | End: 2019-03-01

## 2019-02-02 RX ORDER — SUCRALFATE ORAL 1 G/10ML
1 SUSPENSION ORAL
Qty: 420 ML | Refills: 0 | Status: SHIPPED | OUTPATIENT
Start: 2019-02-02 | End: 2019-04-24

## 2019-02-02 NOTE — CM/SW NOTE
Call from RN re: expected d/c today with [facility] and palliative care. SW spoke with liaison, Yina Worthy. She indicates that [facility] and palliative care ready to start service at home.     [facility] [phone]

## 2019-02-02 NOTE — PROGRESS NOTES
Hematology/Oncology Progress Note    Patient Name: Kavitha Castañeda  Medical Record Number: [id number]    YOB: 1940     Reason for Consultation:  Kavitha Castañeda was seen today for the diagnosis of hepatocellular carcinoma, IVC thrombus    Inte petechiae    Laboratory:  Recent Labs   Lab  01/28/19   0604  01/29/19   0615  01/30/19   0602   WBC  5.1  5.9  5.5   HGB  10.6*  10.9*  11.1*   HCT  33.2*  33.8*  34.0*   PLT  148.0*  145.0*  141.0*   MCV  81.8  81.8  81.9   RDW  14.0  14.4  14.5   NEPREL discharge home from heme/onc standpoint today    True MD Tameka  Hematology/Medical [address] [facility]

## 2019-02-02 NOTE — PLAN OF CARE
A&Ox4. Vitals stable; BP meds administered per orders with improvement. No complaints of pain this shift. No nausea/vomiting this shift. Eating smaller frequent meals. Sitting up in bed for most of the day; encouraged ambulation.  Up with standby assist

## 2019-02-02 NOTE — PROGRESS NOTES
[facility]  Progress Note     Patric Bernardo Patient Status:  Inpatient    1940 MRN [id number]   [facility] 4NW-A Attending Kristen Perez, [address] Day # 7 PCP Wesley Escalante MD   Chief Complaint: [address] IVC thrombus 58*   --    CA  8.7  8.4  8.7   --   8.7  9.0   --    ALB  2.9*  2.7*  2.7*   --    --    --    --    NA  136  139  140   --   139  141   --    K  3.6  3.4*  3.0*  3.0*   < >  3.9  3.9  3.0*  4.1   CL  105  108  108   --   108  106   --    CO2  22.0  22.0 scale  2. Accuchecks  9. Essential hypertension  1. Norvasc, Coreg, Clonidine, Hydralazine and Lasix  2. Monitor hemodynamics  10. Chronic kidney disease  11. Hyponatremia, resolved  1. Monitor UOP, creatinine and electrolytes  12.  Acute pyelonephritis -UC

## 2019-02-04 ENCOUNTER — TELEPHONE (OUTPATIENT)
Dept: INTERNAL MEDICINE CLINIC | Facility: CLINIC | Age: 79
End: 2019-02-04

## 2019-02-04 ENCOUNTER — PATIENT OUTREACH (OUTPATIENT)
Dept: CASE MANAGEMENT | Age: 79
End: 2019-02-04

## 2019-02-04 DIAGNOSIS — C22.0 HEPATOCELLULAR CARCINOMA (HCC): ICD-10-CM

## 2019-02-04 PROCEDURE — 1111F DSCHRG MED/CURRENT MED MERGE: CPT

## 2019-02-04 NOTE — TELEPHONE ENCOUNTER
Lucretia Felton from [facility] called requesting to speak with the nurse to verify patient's current medications. States that she was recently discharged and had a list of medications, which patient states she does not take some of.  Please call  # [phone]

## 2019-02-04 NOTE — PROGRESS NOTES
Condition Update Post Discharge   Discharge Date: 2/2/19  Contact Date: 2/4/2019    Consent Verification:  Assessment Completed With: Caregiver: Karina ramos Permission received per patient?  written  HIPAA Verified?   Yes    General:   • How have you been have any questions about your discharge instructions?  o No      Medications:  Meds reviewed w/ dtr. She states she is going to the pharmacy to  Pantoprazole and Sucralfate this afternoon. Picked up Lovenox.   Dtr reports pt is taking all other med Disp: 1 Bottle Rfl: 6   Azelastine HCl 0.05 % Ophthalmic Solution Place 1 drop into both eyes daily. (Patient taking differently: Place 1 drop into both eyes daily as needed.  ) Disp: 6 mL Rfl: 1   cetirizine 10 MG Oral Tab Take 10 mg by mouth daily.  Disp: pt:  Yes      Have you made all of your follow up appointments? no    Is there any reason as to why you cannot make your appointments?    No     NCM Reviewed upcoming Specialist Appt with patient     Not Applicable        Interventions by NCM:  All d/c inst

## 2019-02-04 NOTE — TELEPHONE ENCOUNTER
Maxime Mcnulty from [place] [address] is calling to verify if the patient should be taking FUROSEMIDE 20 MG Oral Tab. Patient was recently discharged from the hospital and they changed her prescriptions.  She also wants to know if Dr. Destiny Diez will be signing

## 2019-02-04 NOTE — TELEPHONE ENCOUNTER
Agnes Byrd Coordinator called to inform Dr. Marvin Issa pt discharged from hospital 2/2/19    Pt daughter speaks very broken Cremaykel Diallo understood daughter to say that pt was dx with liver cancer.   Michelle Dang advised daughter to schedule fol up with Dr. Alexis Schmidt

## 2019-02-04 NOTE — PAYOR COMM NOTE
--------------  DISCHARGE REVIEW    Payor: Yoni Phelan #:  [id number]  Authorization Number: [id number]    [facility]  Admit date: 1/26/19  Admit time:  2304  Discharge Date: 2/2/2019  3:37 PM     Admitting Ph

## 2019-02-04 NOTE — TELEPHONE ENCOUNTER
Spoke to pt's dtr per HIPPA for TCM today. Pt diagnosed with Liver CA this admission. Dtr states she is concerned that pt's blood sugars have been elevated since d/c and patient has not been eating anything.   Dtr reports pt is only able to drink fluids-

## 2019-02-05 NOTE — TELEPHONE ENCOUNTER
Lum Settler given verbal order. She is requesting updated med list after pt's visit tomorrow. Please fax to [facility] when updated.

## 2019-02-05 NOTE — TELEPHONE ENCOUNTER
I had prescribed metformin 500 mg daily before hospitalization; she can increase to 500 mg twice daily for next couple of days.

## 2019-02-06 ENCOUNTER — TELEPHONE (OUTPATIENT)
Dept: INTERNAL MEDICINE CLINIC | Facility: CLINIC | Age: 79
End: 2019-02-06

## 2019-02-06 LAB
HCV RNA SERPL NAA+PROBE-ACNC: ABNORMAL IU/ML
HCV RNA SERPL NAA+PROBE-LOG IU: 5.78 LOG (IU/ML)
HCV RNA SERPL QL NAA+PROBE: DETECTED

## 2019-02-06 NOTE — TELEPHONE ENCOUNTER
Pt's son called to report pt is weak with decreased appetite. Son has no additional information as he is not with pt. Requesting appointment today with Dr Adriel Moon. Explained to son pt had a scheduled appointment today with Dr Juliet Hoyt which was cancelled.

## 2019-02-06 NOTE — TELEPHONE ENCOUNTER
Pt's daughter called office to report pt weak with decreased appetite, abdominal pain tomy on left side and abdominal swelling  Pt only taking water. No c/o fever, nausea or vomiting. Pt c/o chills. Pt had BM yesterday and c/o abdominal cramping.   No blo

## 2019-02-07 LAB — HCV GENTYP SERPL NAA+PROBE: 3

## 2019-02-07 NOTE — DISCHARGE SUMMARY
[facility] HOSPITALIST  DISCHARGE SUMMARY     Viktoria Shanks Patient Status:  Inpatient    1940 MRN [id number]   [facility] 4NW-A Attending No att. providers found   [facility] Day # 7 PCP Rachele Tirado MD     Date of Admission: 2019  Sandoval pressure and was placed on hypoglycemia protocol with correction factor insulin. Patient's esophagitis was treated with PPI and Carafate and scopolamine patch was added to help decrease secretions.   Patient started on antibiotics for urinary tract infecti Besylate 5 MG Tabs  Commonly known as:  NORVASC      Take 1 tablet (5 mg total) by mouth daily.    Quantity:  90 tablet  Refills:  0     carvedilol 25 MG Tabs  Commonly known as:  COREG      TAKE 1 TABLET(25 MG) BY MOUTH TWICE DAILY WITH MEALS   Quantity: - [facility] - [address] [place] [address]. [address], [phone], [address] [facility] [address], [facility] [address]    Phone:  [phone]   · Enoxaparin Sodium 80 MG/0.8ML Soln  · furosemide 20 MG Tabs     Please  your

## 2019-02-07 NOTE — PROGRESS NOTES
Results reviewed. Pt notified in hospital. Following with oncology, possibly considering hospice. If tx pursued, can consider systemic therapy.

## 2019-02-07 NOTE — TELEPHONE ENCOUNTER
Patient no-showed/cancelled yesterday morning's appointment (2/6) and cancelled this afternoon's appointment (2/7).

## 2019-02-10 NOTE — PROGRESS NOTES
Results reviewed. Spoke to both pt's daughter, and pt's son over the phone and relayed results. They are still considering treatment options. Recommended that they seek opinion with a hepatologist regarding treatment of Hepatitis C in this setting.  Dr. Dilia Redmond

## 2019-02-15 ENCOUNTER — TELEPHONE (OUTPATIENT)
Dept: INTERNAL MEDICINE CLINIC | Facility: CLINIC | Age: 79
End: 2019-02-15

## 2019-02-15 NOTE — TELEPHONE ENCOUNTER
Request to move [facility] home evaluation to next week per patient's request

## 2019-02-18 ENCOUNTER — HOSPITAL ENCOUNTER (OUTPATIENT)
Dept: GENERAL RADIOLOGY | Age: 79
Discharge: HOME | End: 2019-02-18
Attending: INTERNAL MEDICINE
Payer: MEDICAID

## 2019-02-18 ENCOUNTER — TELEPHONE (OUTPATIENT)
Dept: INTERNAL MEDICINE CLINIC | Facility: CLINIC | Age: 79
End: 2019-02-18

## 2019-02-18 ENCOUNTER — OFFICE VISIT (OUTPATIENT)
Dept: INTERNAL MEDICINE CLINIC | Facility: CLINIC | Age: 79
End: 2019-02-18
Payer: MEDICAID

## 2019-02-18 VITALS — HEART RATE: 70 BPM | TEMPERATURE: 98 F | RESPIRATION RATE: 22 BRPM

## 2019-02-18 DIAGNOSIS — C22.0 HEPATOCELLULAR CARCINOMA (HCC): ICD-10-CM

## 2019-02-18 DIAGNOSIS — B19.20 HEPATITIS C VIRUS INFECTION WITHOUT HEPATIC COMA, UNSPECIFIED CHRONICITY: ICD-10-CM

## 2019-02-18 DIAGNOSIS — R06.00 DYSPNEA ON EXERTION: ICD-10-CM

## 2019-02-18 DIAGNOSIS — E11.65 CONTROLLED TYPE 2 DIABETES MELLITUS WITH HYPERGLYCEMIA, WITHOUT LONG-TERM CURRENT USE OF INSULIN (HCC): ICD-10-CM

## 2019-02-18 DIAGNOSIS — R06.00 DYSPNEA ON EXERTION: Primary | ICD-10-CM

## 2019-02-18 DIAGNOSIS — K64.4 EXTERNAL HEMORRHOIDS: ICD-10-CM

## 2019-02-18 DIAGNOSIS — I82.220 IVC THROMBOSIS (HCC): ICD-10-CM

## 2019-02-18 PROCEDURE — 99214 OFFICE O/P EST MOD 30 MIN: CPT | Performed by: INTERNAL MEDICINE

## 2019-02-18 PROCEDURE — 71046 X-RAY EXAM CHEST 2 VIEWS: CPT | Performed by: INTERNAL MEDICINE

## 2019-02-18 NOTE — TELEPHONE ENCOUNTER
Spoke with Carmen Reyes from Dr Kandi Litten office and was able to secure an appointment for the patient on March 18th. Carmen Reyes did say that she will be sending a message to Dr. Savage Hughes to see if the patient can get in any sooner considering the Dx.    The patients so

## 2019-02-18 NOTE — TELEPHONE ENCOUNTER
Called Dr Gosia Ann office in behalf of the patient and Dr. Mir Hdz to attempt to secure an appointment for the patient. M for office to call me back - patient needs appointment for Hep C and second opinion for liver cancer.

## 2019-02-18 NOTE — PROGRESS NOTES
Sisi Younger is a 67year old female. HPI:   Patient presents with: Follow - Up  Hemorrhoids  Tumor: Liver rt recently diagnosed  Hepatitis C: recently diagnosed  Shortness Of Breath  Patient presents to discuss several issues.   Here with daughter-in MUSCULOSKELETAL: chronic joint pain  NEURO: denies headaches  ALLERGY:   Dust Mite Extract       OTHER (SEE COMMENTS)  Seasonal                  PAST HISTORY:     Current Outpatient Medications:   •  furosemide 20 MG Oral Tab, Take 1 tablet (20 mg total) Disp: 90 tablet, Rfl: 1  •  Polyethylene Glycol 3350 Oral Powder, Take 17 g by mouth daily. , Disp: 1 Bottle, Rfl: 6  •  Azelastine HCl 0.05 % Ophthalmic Solution, Place 1 drop into both eyes daily.  (Patient taking differently: Place 1 drop into both eyes d daily.  Dispense: 28.35 g; Refill: 3    3. Hepatocellular carcinoma ([facility] [address]  4. Hepatitis C virus infection without hepatic coma, unspecified chronicity  Diagnosed during hospitalization end of January. Hep C positive as well.    She saw Radiation Oncology a

## 2019-02-18 NOTE — PATIENT INSTRUCTIONS
- Get chest x-ray done today  - Use hydrocortisone cream 2.5% twice daily for hemorrhoids  - We will try and contact Dr. Aileen Borja office for an office visit/appointment in the next few weeks. It was a pleasure seeing you in the clinic today.   Thank you f

## 2019-02-19 PROBLEM — B19.20 HEPATITIS C VIRUS INFECTION WITHOUT HEPATIC COMA: Status: ACTIVE | Noted: 2019-02-19

## 2019-02-19 RX ORDER — FUROSEMIDE 20 MG/1
20 TABLET ORAL 2 TIMES DAILY
Qty: 60 TABLET | Refills: 1 | Status: SHIPPED | OUTPATIENT
Start: 2019-02-19 | End: 2019-04-24

## 2019-02-20 ENCOUNTER — OFFICE VISIT (OUTPATIENT)
Dept: SURGERY | Facility: CLINIC | Age: 79
End: 2019-02-20
Payer: MEDICAID

## 2019-02-20 VITALS
RESPIRATION RATE: 16 BRPM | HEIGHT: 63 IN | BODY MASS INDEX: 22.59 KG/M2 | OXYGEN SATURATION: 98 % | HEART RATE: 76 BPM | DIASTOLIC BLOOD PRESSURE: 81 MMHG | TEMPERATURE: 99 F | WEIGHT: 127.5 LBS | SYSTOLIC BLOOD PRESSURE: 144 MMHG

## 2019-02-20 NOTE — PROGRESS NOTES
[facility]  [address], [address]  [address], [address]  [phone] ([phone]) times daily. , Disp: 60 tablet, Rfl: 1  •  hydrocortisone 2.5 % Rectal Cream, Place 1 Application rectally 2 (two) times daily. , Disp: 28.35 g, Rfl: 3  •  Enoxaparin Sodium 80 MG/0.8ML Subcutaneous Solution, Inject 0.8 mL (80 mg total) into the skin daily. , ), Disp: 6 mL, Rfl: 1  •  cetirizine 10 MG Oral Tab, Take 10 mg by mouth daily. , Disp: , Rfl:     Exam:  Vitals per chart  Gen: NAD, in wheel chair  HEENT: Anicteric  Lymph: no cervical LAD  Chest: no angiomata  CV: RRR, no murmur, no edema  Lungs: Clear t

## 2019-02-22 NOTE — TELEPHONE ENCOUNTER
Pt went to go see Dr Jag David and would like a call back from nurse or doctor would like to know what the next step is and also would like to take her to another specialist

## 2019-02-25 NOTE — TELEPHONE ENCOUNTER
Patient's  is calling for nurse to discuss multiple issues patient is experiencing.   She additionally is having chills all night long with other symptoms; please callback to triage

## 2019-02-26 ENCOUNTER — TELEPHONE (OUTPATIENT)
Dept: INTERNAL MEDICINE CLINIC | Facility: CLINIC | Age: 79
End: 2019-02-26

## 2019-02-26 NOTE — TELEPHONE ENCOUNTER
Pt's son requesting a return call from Dr Vicky Ojeda to discuss pt's condition. #[phone]. Pt was with headache, chills and possible fever yesterday although temp not taken. Sunday 7 am pt with bloody nose for a few minutes.   B/P midnight today 205/102

## 2019-02-26 NOTE — TELEPHONE ENCOUNTER
Gem Coyne from [facility] informed and will notify pt's son. B/p taken by [employer] 126/70 which just about matched pt's machine. Pt had blood work done today ordered by Mariela Palacios. [employer] will update office when results in.

## 2019-02-26 NOTE — TELEPHONE ENCOUNTER
Koffi Issa from [facility] called to verify the following medications. Amlodipine 5 mg daily,  Carvedilol 25 mg bid,  Clonidine 0.1 mg bid, furosemide 20 mg bid,  Hydralazine 100 mg bid,  Losartan 100 mg once daily.    Pt's [place] who

## 2019-02-26 NOTE — TELEPHONE ENCOUNTER
With her numerous symptoms,  I would recommend emergency department evaluation. As far as next steps/second opinion after seeing Dr. Radha Palmer, they did see Dr. Ebonie Cherry at [facility] from Radiation Oncology. He had discussed radiation therapy with them.   Paul Wright

## 2019-02-26 NOTE — TELEPHONE ENCOUNTER
Pt's son given instructions. Verbalizes understanding.   Declined to take down contact information at this time for 2nd opinion  stating pt will be seeing Dr Mery Bolton on Thursday and they will discuss with him 1st.

## 2019-03-01 DIAGNOSIS — C22.0 HEPATOCELLULAR CARCINOMA (HCC): Primary | ICD-10-CM

## 2019-03-01 DIAGNOSIS — I82.220 IVC THROMBOSIS (HCC): ICD-10-CM

## 2019-03-01 RX ORDER — AMLODIPINE BESYLATE 5 MG/1
TABLET ORAL
Qty: 90 TABLET | Refills: 0 | Status: SHIPPED | OUTPATIENT
Start: 2019-03-01 | End: 2019-05-30

## 2019-03-01 RX ORDER — ENOXAPARIN SODIUM 100 MG/ML
80 INJECTION SUBCUTANEOUS DAILY
Qty: 30 SYRINGE | Refills: 2 | Status: ON HOLD | OUTPATIENT
Start: 2019-03-01 | End: 2019-04-06

## 2019-03-01 NOTE — TELEPHONE ENCOUNTER
Refill requested:   Requested Prescriptions     Pending Prescriptions Disp Refills   • METFORMIN  MG Oral Tab [Pharmacy Med Name: METFORMIN 500MG TABLETS] 90 tablet 0     Sig: TAKE 1 TABLET(500 MG) BY MOUTH DAILY WITH BREAKFAST   • AMLODIPINE BESYLA

## 2019-03-01 NOTE — TELEPHONE ENCOUNTER
Enoxaparin Sodium 80 MG/0.8ML Subcutaneous Solution  [id number] - Sandip Goodwin, [address] AT [address], [phone], [phone]  Pt would also like a call back to give a condition update

## 2019-03-05 ENCOUNTER — TELEPHONE (OUTPATIENT)
Dept: INTERNAL MEDICINE CLINIC | Facility: CLINIC | Age: 79
End: 2019-03-05

## 2019-03-05 NOTE — TELEPHONE ENCOUNTER
Paged by patient's son that there is an issue with her enoxaparin prescription. Spoke with [facility] pharmacist who gave phone # for Yvonne Nunez Prior auth done on covermymeds. com by Aby Baird, medical assistant.      Enoxaparin is required due to c

## 2019-03-06 NOTE — TELEPHONE ENCOUNTER
Approved today per coverStrongLoops. com    Details of this decision are provided on the physician outcome notice which has been faxed to the number on file.

## 2019-03-06 NOTE — TELEPHONE ENCOUNTER
PA required for enoxaparin -     Key: [id number]    Your information has been submitted to [employer] is reviewing the PA request and you will receive an electronic response.  You may check for the updated outcome later by reopening this request.

## 2019-03-06 NOTE — TELEPHONE ENCOUNTER
Contacted patients insurance and spoke with Katherin Aceves to see if there was any way that the PA could be further expedited - after 20 minutes on the phone it turns out that this particular request is going to require a 24 hour determination period - determinat

## 2019-03-08 ENCOUNTER — TELEPHONE (OUTPATIENT)
Dept: INTERNAL MEDICINE CLINIC | Facility: CLINIC | Age: 79
End: 2019-03-08

## 2019-03-08 NOTE — TELEPHONE ENCOUNTER
Pt son would like a  Call back would like to update doctor with treatment plan pt will be going through

## 2019-03-08 NOTE — TELEPHONE ENCOUNTER
Yes - I have been following periodically through [place]. We can see most of the [facility] notes through that.

## 2019-03-08 NOTE — TELEPHONE ENCOUNTER
Spoke with pt's son and he stated they have started SBRT with Dr. Kelley Najera at [facility]. Pt son stated he just wanted provider to know.

## 2019-03-20 ENCOUNTER — MED REC SCAN ONLY (OUTPATIENT)
Dept: HEMATOLOGY/ONCOLOGY | Facility: HOSPITAL | Age: 79
End: 2019-03-20

## 2019-03-26 ENCOUNTER — TELEPHONE (OUTPATIENT)
Dept: INTERNAL MEDICINE CLINIC | Facility: CLINIC | Age: 79
End: 2019-03-26

## 2019-03-26 NOTE — TELEPHONE ENCOUNTER
Would continue for now (at least for another month) in light of clots in liver. There is no blood monitoring that needs to be done with Lovenox. We can have patient evaluated by Hematology in future to see how long she needs to stay on Lovenox.   As far a

## 2019-03-26 NOTE — TELEPHONE ENCOUNTER
Pt currently taking Enoxaparin Sodium for about 2 months. And pt's son inquiring if any monitoring needs to be done?

## 2019-03-26 NOTE — TELEPHONE ENCOUNTER
Arun Brooks from [facility] calling in, stated they were unable to see the patient, due to the fact she was not admitted at [facility]. She only had radiation treatment, Mon -Fri. She will now be seen at [facility] this Thursday.     Olena Dakin: [phone]

## 2019-03-26 NOTE — TELEPHONE ENCOUNTER
Patients son has questions regarding the medication that shes taking for bld clots and states that shes been taking it for 2 months now and has anyone been monitoring it

## 2019-04-01 RX ORDER — HYDRALAZINE HYDROCHLORIDE 100 MG/1
TABLET, FILM COATED ORAL
Qty: 180 TABLET | Refills: 0 | Status: SHIPPED | OUTPATIENT
Start: 2019-04-01 | End: 2019-07-04

## 2019-04-01 RX ORDER — CARVEDILOL 25 MG/1
TABLET ORAL
Qty: 180 TABLET | Refills: 0 | Status: SHIPPED | OUTPATIENT
Start: 2019-04-01 | End: 2019-07-04

## 2019-04-01 NOTE — TELEPHONE ENCOUNTER
Refill requested:   Requested Prescriptions     Pending Prescriptions Disp Refills   • HYDRALAZINE  MG Oral Tab [Pharmacy Med Name: HYDRALAZINE 100MG (HUNDRED MG) TABS] 180 tablet 0     Sig: TAKE 1 TABLET(100 MG) BY MOUTH TWICE DAILY   • CARVEDILOL

## 2019-04-02 RX ORDER — LOSARTAN POTASSIUM 100 MG/1
TABLET ORAL
Qty: 90 TABLET | Refills: 0 | Status: SHIPPED | OUTPATIENT
Start: 2019-04-02 | End: 2019-07-04

## 2019-04-02 RX ORDER — CLONIDINE HYDROCHLORIDE 0.1 MG/1
TABLET ORAL
Qty: 180 TABLET | Refills: 0 | Status: SHIPPED | OUTPATIENT
Start: 2019-04-02 | End: 2019-07-04

## 2019-04-02 NOTE — TELEPHONE ENCOUNTER
Protocol passed.      Medication(s) to Refill:   Requested Prescriptions     Pending Prescriptions Disp Refills   • CLONIDINE HCL 0.1 MG Oral Tab [Pharmacy Med Name: CLONIDINE 0.1MG TABLETS] 180 tablet 0     Sig: TAKE 1 TABLET(0.1 MG) BY MOUTH TWICE DAILY

## 2019-04-03 ENCOUNTER — HOSPITAL ENCOUNTER (OUTPATIENT)
Facility: HOSPITAL | Age: 79
Setting detail: OBSERVATION
Discharge: STILL ADMITTED OR ADMINISTRATIVE | End: 2019-04-06
Attending: EMERGENCY MEDICINE | Admitting: HOSPITALIST
Payer: MEDICAID

## 2019-04-03 ENCOUNTER — APPOINTMENT (OUTPATIENT)
Dept: GENERAL RADIOLOGY | Facility: HOSPITAL | Age: 79
End: 2019-04-03
Attending: PHYSICIAN ASSISTANT
Payer: MEDICAID

## 2019-04-03 DIAGNOSIS — R50.9 FEVER, UNSPECIFIED FEVER CAUSE: Primary | ICD-10-CM

## 2019-04-03 DIAGNOSIS — K59.00 CONSTIPATION, UNSPECIFIED CONSTIPATION TYPE: ICD-10-CM

## 2019-04-03 DIAGNOSIS — C22.0 HEPATOCELLULAR CARCINOMA (HCC): ICD-10-CM

## 2019-04-03 DIAGNOSIS — I82.220 IVC THROMBOSIS (HCC): ICD-10-CM

## 2019-04-03 DIAGNOSIS — R53.1 WEAKNESS GENERALIZED: ICD-10-CM

## 2019-04-03 DIAGNOSIS — N39.0 URINARY TRACT INFECTION WITHOUT HEMATURIA, SITE UNSPECIFIED: ICD-10-CM

## 2019-04-03 PROBLEM — E87.20 METABOLIC ACIDOSIS: Status: ACTIVE | Noted: 2019-04-03

## 2019-04-03 PROBLEM — R73.9 HYPERGLYCEMIA: Status: ACTIVE | Noted: 2019-04-03

## 2019-04-03 PROBLEM — D69.6 THROMBOCYTOPENIA (HCC): Status: ACTIVE | Noted: 2019-04-03

## 2019-04-03 PROBLEM — D64.9 ANEMIA: Status: ACTIVE | Noted: 2019-04-03

## 2019-04-03 PROBLEM — E87.2 METABOLIC ACIDOSIS: Status: ACTIVE | Noted: 2019-04-03

## 2019-04-03 PROCEDURE — 71046 X-RAY EXAM CHEST 2 VIEWS: CPT | Performed by: PHYSICIAN ASSISTANT

## 2019-04-03 RX ORDER — SODIUM CHLORIDE 9 MG/ML
INJECTION, SOLUTION INTRAVENOUS CONTINUOUS
Status: DISCONTINUED | OUTPATIENT
Start: 2019-04-03 | End: 2019-04-05

## 2019-04-04 ENCOUNTER — APPOINTMENT (OUTPATIENT)
Dept: GENERAL RADIOLOGY | Facility: HOSPITAL | Age: 79
End: 2019-04-04
Attending: HOSPITALIST
Payer: MEDICAID

## 2019-04-04 PROBLEM — N39.0 URINARY TRACT INFECTION WITHOUT HEMATURIA, SITE UNSPECIFIED: Status: ACTIVE | Noted: 2019-04-04

## 2019-04-04 PROBLEM — R50.9 FEVER, UNSPECIFIED FEVER CAUSE: Status: ACTIVE | Noted: 2019-04-04

## 2019-04-04 PROBLEM — R53.1 WEAKNESS GENERALIZED: Status: ACTIVE | Noted: 2019-04-04

## 2019-04-04 PROCEDURE — 71045 X-RAY EXAM CHEST 1 VIEW: CPT | Performed by: HOSPITALIST

## 2019-04-04 PROCEDURE — 99214 OFFICE O/P EST MOD 30 MIN: CPT | Performed by: INTERNAL MEDICINE

## 2019-04-04 PROCEDURE — 99220 INITIAL OBSERVATION CARE,LEVL III: CPT | Performed by: HOSPITALIST

## 2019-04-04 RX ORDER — ONDANSETRON 2 MG/ML
4 INJECTION INTRAMUSCULAR; INTRAVENOUS EVERY 6 HOURS PRN
Status: DISCONTINUED | OUTPATIENT
Start: 2019-04-04 | End: 2019-04-06

## 2019-04-04 RX ORDER — IPRATROPIUM BROMIDE AND ALBUTEROL SULFATE 2.5; .5 MG/3ML; MG/3ML
SOLUTION RESPIRATORY (INHALATION)
Status: COMPLETED
Start: 2019-04-04 | End: 2019-04-04

## 2019-04-04 RX ORDER — DEXTROSE MONOHYDRATE 25 G/50ML
50 INJECTION, SOLUTION INTRAVENOUS
Status: DISCONTINUED | OUTPATIENT
Start: 2019-04-04 | End: 2019-04-06

## 2019-04-04 RX ORDER — DOCUSATE SODIUM 100 MG/1
100 CAPSULE, LIQUID FILLED ORAL 2 TIMES DAILY
Status: DISCONTINUED | OUTPATIENT
Start: 2019-04-04 | End: 2019-04-06

## 2019-04-04 RX ORDER — CETIRIZINE HYDROCHLORIDE 10 MG/1
10 TABLET ORAL DAILY
Status: DISCONTINUED | OUTPATIENT
Start: 2019-04-04 | End: 2019-04-06

## 2019-04-04 RX ORDER — METHYLPREDNISOLONE SODIUM SUCCINATE 40 MG/ML
40 INJECTION, POWDER, LYOPHILIZED, FOR SOLUTION INTRAMUSCULAR; INTRAVENOUS EVERY 6 HOURS
Status: DISCONTINUED | OUTPATIENT
Start: 2019-04-04 | End: 2019-04-06

## 2019-04-04 RX ORDER — CARVEDILOL 12.5 MG/1
25 TABLET ORAL 2 TIMES DAILY WITH MEALS
Status: DISCONTINUED | OUTPATIENT
Start: 2019-04-04 | End: 2019-04-06

## 2019-04-04 RX ORDER — LOSARTAN POTASSIUM 100 MG/1
100 TABLET ORAL DAILY
Status: DISCONTINUED | OUTPATIENT
Start: 2019-04-04 | End: 2019-04-06

## 2019-04-04 RX ORDER — HYDRALAZINE HYDROCHLORIDE 50 MG/1
100 TABLET, FILM COATED ORAL 2 TIMES DAILY
Status: DISCONTINUED | OUTPATIENT
Start: 2019-04-04 | End: 2019-04-06

## 2019-04-04 RX ORDER — IPRATROPIUM BROMIDE AND ALBUTEROL SULFATE 2.5; .5 MG/3ML; MG/3ML
3 SOLUTION RESPIRATORY (INHALATION) EVERY 4 HOURS PRN
Status: DISCONTINUED | OUTPATIENT
Start: 2019-04-04 | End: 2019-04-06

## 2019-04-04 RX ORDER — SUCRALFATE ORAL 1 G/10ML
1 SUSPENSION ORAL
Status: DISCONTINUED | OUTPATIENT
Start: 2019-04-04 | End: 2019-04-06

## 2019-04-04 RX ORDER — METOCLOPRAMIDE HYDROCHLORIDE 5 MG/ML
10 INJECTION INTRAMUSCULAR; INTRAVENOUS EVERY 8 HOURS PRN
Status: DISCONTINUED | OUTPATIENT
Start: 2019-04-04 | End: 2019-04-06

## 2019-04-04 RX ORDER — SENNA AND DOCUSATE SODIUM 50; 8.6 MG/1; MG/1
2 TABLET, FILM COATED ORAL 2 TIMES DAILY
Status: DISCONTINUED | OUTPATIENT
Start: 2019-04-04 | End: 2019-04-06

## 2019-04-04 RX ORDER — AMLODIPINE BESYLATE 5 MG/1
5 TABLET ORAL DAILY
Status: DISCONTINUED | OUTPATIENT
Start: 2019-04-04 | End: 2019-04-06

## 2019-04-04 RX ORDER — ENOXAPARIN SODIUM 100 MG/ML
80 INJECTION SUBCUTANEOUS DAILY
Status: DISCONTINUED | OUTPATIENT
Start: 2019-04-04 | End: 2019-04-04

## 2019-04-04 RX ORDER — CLONIDINE HYDROCHLORIDE 0.1 MG/1
0.1 TABLET ORAL 2 TIMES DAILY
Status: DISCONTINUED | OUTPATIENT
Start: 2019-04-04 | End: 2019-04-06

## 2019-04-04 NOTE — ED PROVIDER NOTES
Patient Seen in: [facility] Emergency Department    History   Patient presents with:  Fever (infectious)    Stated Complaint: cough, fever    HPI    CHIEF COMPLAINT: Fever, cough, dysuria, fatigue    HISTORY OF PRESENT ILLNESS: Patient is a pleasant Cataract    • Diabetes ([facility] [address]    • Environmental and seasonal allergies    • Essential hypertension    • High blood pressure        Past Surgical History:   Procedure Laterality Date   • REMOVAL GALLBLADDER     • TOTAL ABDOM HYSTERECTOMY             Social lower extremity motor strength. Gait normal.  Skin:  warm and dry, no rashes. No jaundice. Brisk capillary refill  Musculoskeletal: neck is supple, no lymphadenopathy, non tender. no meningeal signs.        Extremities are symmetrical, full range of motion DIFFERENTIAL[[id number]]          Abnormal            Final result                 Please view results for these tests on the individual orders.    SCAN SLIDE   RAINBOW DRAW BLUE   RAINBOW DRAW LAVENDER   RAINBOW DRAW LIGHT GREEN   RAINBOW DRAW GOLD   BLOOD follow-up provider specified.       Medications Prescribed:  Current Discharge Medication List        Present on Admission  Date Reviewed: 2/24/2019          ICD-10-CM Noted POA    Anemia D64.9 4/3/2019 Yes    Fever R50.9 4/3/2019 Unknown    Hyperglycemia R

## 2019-04-04 NOTE — PHYSICAL THERAPY NOTE
PHYSICAL THERAPY EVALUATION - INPATIENT     Room Number: 417/417-A  Evaluation Date: 4/4/2019  Type of Evaluation: Initial  Physician Order: PT Eval and Treat    Presenting Problem: fever, UTI, weakness  Reason for Therapy: Mobility Dysfunction and D and directions without difficulty  · Problem Solving:  assistance required to implement solutions    RANGE OF MOTION AND STRENGTH ASSESSMENT  Upper extremity ROM and strength are within functional limits     Lower extremity ROM is within functional limits correct RW each attempt without cueing. Stand to sit at chair with supervision.     Exercise/Education Provided:  Bed mobility  Body mechanics  Functional activity tolerated  Gait training  Posture  Transfer training    Patient End of Session: Up in chair; Patient is able to demonstrate transfers Sit to/from Stand at assistance level: supervision     Goal #3 Patient is able to ambulate 150 feet with assist device: walker - rolling at assistance level: supervision     Goal #4    Goal #5    Goal #6    Goal Com

## 2019-04-04 NOTE — CM/SW NOTE
04/04/19 1500   CM/SW Referral Data   Referral Source Social Work (self-referral)   Reason for Referral Discharge planning   Informant Patient   Patient Info   Patient's Mental Status Alert;Oriented   Patient's [address]   Number of Levels

## 2019-04-04 NOTE — HOME CARE LIAISON
Patient is current with [facility]:  RN PT    Addendum:   This patient will require a face to face to resume home health prior to discharge due to insurance requirements

## 2019-04-04 NOTE — PLAN OF CARE
Pt AOx3. VSS. Up with PT this AM, ambulating in room. Daughter at bedside. IV abx for UTI. Lovenox still on hold d/t platelet count. Oncology on consult. Follows with [facility] for management. Good appetite. Oncology adding stool softeners for constipation.

## 2019-04-04 NOTE — ED NOTES
Patient still does not feel the urge to urinate despite fluids and encouragment, will reassess again in15 min

## 2019-04-04 NOTE — ED INITIAL ASSESSMENT (HPI)
Fever x2-3 days. tmax 102. Cough and wheezing x2 days. 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 Last radiation 2 weeks ago.

## 2019-04-04 NOTE — CONSULTS
Hem/Onc Report of Consultation    Patient Name: Evaristo Mandujano   YOB: 1940   Medical Record Number: [id number]   CSN: [id number]   Consulting Physician: Dr. Thomas Brown   Referring Provider(s): Dr. Afshin Olvera  Date of Consultation: 4/4/2019     Reason f name: Not on file      Number of children: Not on file      Years of education: Not on file      Highest education level: Not on file    Occupational History      Not on file    Social Needs      Financial resource strain: Not on file      Food insecurity: Daily   sucralfate (CARAFATE) 1 GM/10ML suspension 1 g 1 g Oral TID AC and HS   ipratropium-albuterol (DUONEB) nebulizer solution 3 mL 3 mL Nebulization Q4H PRN   ondansetron HCl (ZOFRAN) injection 4 mg 4 mg Intravenous Q6H PRN   Metoclopramide HCl (REGLAN mouth 2 (two) times daily. (Patient taking differently: Take 8.6 mg by mouth 2 (two) times daily as needed.  )   Blood Glucose Monitoring Suppl (ONETOUCH ULTRA 2) w/Device Does not apply Kit 1 Device by Other route daily.    Glucose Blood (ONETOUCH ULTRA BL 7:52 PM   Result Value Ref Range    Hold Lavender Auto Resulted    RAINBOW DRAW LIGHT GREEN    Collection Time: 04/03/19  7:52 PM   Result Value Ref Range    Hold Lt Green Auto Resulted    RAINBOW DRAW GOLD    Collection Time: 04/03/19  7:52 PM   Result Va %    Eosinophil % 1.6 %    Basophil % 0.5 %    Immature Granulocyte % 1.0 %   SCAN SLIDE    Collection Time: 04/03/19  7:52 PM   Result Value Ref Range    Slide Review Slide reviewed,morphology review added    RBC MORPHOLOGY SCAN    Collection Time: 04/03/ RSV by PCR Negative Negative   POCT GLUCOSE    Collection Time: 04/04/19 12:52 AM   Result Value Ref Range    POC Glucose 117 (H) 70 - 99 mg/dL   BASIC METABOLIC PANEL (8)    Collection Time: 04/04/19  6:20 AM   Result Value Ref Range    Glucose 104 (H) 70 WBC 3.8 (L) 04/03/2019    WBC 5.5 01/30/2019     Lab Results   Component Value Date    HGB 9.0 (L) 04/04/2019    HGB 9.9 (L) 04/03/2019    HGB 11.1 (L) 01/30/2019    Lab Results   Component Value Date    PLT 49.0 (L) 04/04/2019    PLT 54.0 (L) 04/03/2019 50K.    Questionable right paratracheal soft tissue density? CT for further eval? Dr. Kaufman Nurse to see this afternoon.      Electronically Signed by:    Amber Curiel NP-C  Nurse Practitioner  [facility]      Addendum Oncology [facility] Note:

## 2019-04-04 NOTE — PROGRESS NOTES
NURSING ADMISSION NOTE      Patient admitted via Cart from ER, accompanied by her daughter.   Awake, generally weak, assisted to the bathroom, gait is  Slow and unsteady,  Audible wheezing noted with  Non productive  Cough, afebrile, Initial abt  For UT

## 2019-04-05 PROCEDURE — 99225 SUBSEQUENT OBSERVATION CARE: CPT | Performed by: STUDENT IN AN ORGANIZED HEALTH CARE EDUCATION/TRAINING PROGRAM

## 2019-04-05 PROCEDURE — 99213 OFFICE O/P EST LOW 20 MIN: CPT | Performed by: INTERNAL MEDICINE

## 2019-04-05 RX ORDER — ENOXAPARIN SODIUM 100 MG/ML
40 INJECTION SUBCUTANEOUS DAILY
Status: DISCONTINUED | OUTPATIENT
Start: 2019-04-05 | End: 2019-04-06

## 2019-04-05 RX ORDER — FUROSEMIDE 10 MG/ML
20 INJECTION INTRAMUSCULAR; INTRAVENOUS ONCE
Status: COMPLETED | OUTPATIENT
Start: 2019-04-05 | End: 2019-04-05

## 2019-04-05 RX ORDER — FLUTICASONE PROPIONATE 50 MCG
1 SPRAY, SUSPENSION (ML) NASAL DAILY
Status: DISCONTINUED | OUTPATIENT
Start: 2019-04-05 | End: 2019-04-06

## 2019-04-05 RX ORDER — POLYETHYLENE GLYCOL 3350 17 G/17G
17 POWDER, FOR SOLUTION ORAL DAILY
Status: DISCONTINUED | OUTPATIENT
Start: 2019-04-05 | End: 2019-04-06

## 2019-04-05 NOTE — DIETARY NOTE
NUTRITION INITIAL ASSESSMENT    Pt is at moderate nutrition risk. Pt does not meet malnutrition criteria.     NUTRITION DIAGNOSIS/PROBLEM:    Inadequate oral intake related to inability to take sufficient po as evidenced by decreased appetite and wt trendin kg)  Protein: 57-74 grams protein/day (1-1.3 grams protein per kg)  Fluid: ~1 ml/kcal or per MD discretion    MONITOR AND EVALUATE/NUTRITION GOALS:    1. PO intake to meet at least 75% patient nutrition prescription  2.  At least 75% intake of oral suppleme

## 2019-04-05 NOTE — PROGRESS NOTES
Pt with c/o sob and wheezing, respiratory called and pt had breathing treatment without improvement, per respiratory second treatment given without desired effect.  Was called to room by respiratory tech who explained that pt was very short of breath, was p

## 2019-04-05 NOTE — PROGRESS NOTES
Heme/Onc Progress Note - [facility]      Chief Complaint:    Follow up for [facility] [address] with fever. Interim History:      She is feeling a little better. She had no fever since last visit. She has had constipation. She had a bowel movement yesterday.  She has no pa care.     Pancytopenia:  Thrombocytopenia  Anemia complicating neoplastic disease    Her blood counts are slightly better. Continue treatment of infection. Repeat cbc in am.      Constipation:  Likely slow transit constipation. Add daily Miralax.  Continue

## 2019-04-05 NOTE — OCCUPATIONAL THERAPY NOTE
OCCUPATIONAL THERAPY QUICK EVALUATION - INPATIENT    Room Number: 417/417-A  Evaluation Date: 4/4/2019     Type of Evaluation: Quick Eval  Presenting Problem: fever, UTI, weakness     Physician Order: IP Consult to Occupational Therapy  Reason for Therapy: is none identified     OBJECTIVE  Precautions:  needed  Fall Risk: Standard fall risk    WEIGHT BEARING RESTRICTION  Weight Bearing Restriction: None                PAIN ASSESSMENT             COGNITION  Unable to formally assess as pt is not pr session/findings; Family present    ASSESSMENT     Patient is a 67year old female admitted on 4/3/2019 for fever, UTI, weakness. Complete medical history and occupational profile noted above. Functional outcome measures completed include AM-PAC.  Pt is curr

## 2019-04-05 NOTE — PROGRESS NOTES
[facility] HOSPITALIST  Progress Note     Foxblessing Castro Patient Status:  Observation    1940 MRN [id number]   [facility] 4NW-A Attending Chad Johnson MD   Hosp Day # 0 PCP Shawn Farrell MD     Chief Complaint: Fever     S: Patient  Pt Estimated Creatinine Clearance: 52 mL/min (based on SCr of 0.77 mg/dL). Recent Labs   Lab  04/03/19 1952   PTP  13.2   INR  0.96       No results for input(s): TROP, CK in the last 168 hours. Imaging: Imaging data reviewed in Epic.     Me

## 2019-04-06 VITALS
OXYGEN SATURATION: 96 % | SYSTOLIC BLOOD PRESSURE: 144 MMHG | BODY MASS INDEX: 21.4 KG/M2 | HEART RATE: 77 BPM | HEIGHT: 64 IN | WEIGHT: 125.38 LBS | TEMPERATURE: 98 F | DIASTOLIC BLOOD PRESSURE: 69 MMHG | RESPIRATION RATE: 20 BRPM

## 2019-04-06 PROCEDURE — 99213 OFFICE O/P EST LOW 20 MIN: CPT | Performed by: SPECIALIST

## 2019-04-06 PROCEDURE — 99217 OBSERVATION CARE DISCHARGE: CPT | Performed by: STUDENT IN AN ORGANIZED HEALTH CARE EDUCATION/TRAINING PROGRAM

## 2019-04-06 RX ORDER — ENOXAPARIN SODIUM 100 MG/ML
80 INJECTION SUBCUTANEOUS DAILY
Qty: 30 SYRINGE | Refills: 0 | Status: SHIPPED | OUTPATIENT
Start: 2019-04-06 | End: 2019-04-13

## 2019-04-06 RX ORDER — METHYLPREDNISOLONE SODIUM SUCCINATE 40 MG/ML
40 INJECTION, POWDER, LYOPHILIZED, FOR SOLUTION INTRAMUSCULAR; INTRAVENOUS EVERY 12 HOURS
Status: DISCONTINUED | OUTPATIENT
Start: 2019-04-06 | End: 2019-04-06

## 2019-04-06 RX ORDER — PREDNISONE 10 MG/1
TABLET ORAL
Qty: 15 TABLET | Refills: 0 | Status: SHIPPED | OUTPATIENT
Start: 2019-04-06 | End: 2019-04-24 | Stop reason: ALTCHOICE

## 2019-04-06 RX ORDER — FUROSEMIDE 10 MG/ML
20 INJECTION INTRAMUSCULAR; INTRAVENOUS ONCE
Status: COMPLETED | OUTPATIENT
Start: 2019-04-06 | End: 2019-04-06

## 2019-04-06 RX ORDER — LEVOFLOXACIN 500 MG/1
500 TABLET, FILM COATED ORAL DAILY
Qty: 4 TABLET | Refills: 0 | Status: SHIPPED | OUTPATIENT
Start: 2019-04-06 | End: 2019-04-10

## 2019-04-06 RX ORDER — IPRATROPIUM BROMIDE AND ALBUTEROL SULFATE 2.5; .5 MG/3ML; MG/3ML
3 SOLUTION RESPIRATORY (INHALATION)
Status: DISCONTINUED | OUTPATIENT
Start: 2019-04-06 | End: 2019-04-06

## 2019-04-06 NOTE — DISCHARGE SUMMARY
[facility] HOSPITALIST  DISCHARGE SUMMARY     Iqra Banks Patient Status:  Observation    1940 MRN [id number]   [facility] 4NW-A Attending Apple Seymour MD   Hosp Day # 0 PCP David Levin MD     Date of Admission: 4/3/2019  Date of Stop taking on:  4/10/2019  Quantity:  4 tablet  Refills:  0     predniSONE 10 MG Tabs  Commonly known as:  DELTASONE      Take 50 mg on 4/7 once daily, 40 mg once daily on 4/7, 30 mg once daily on 4/8, 20 mg once daily on 4/9, 10 mg on 4/10 then stop   Qu times daily.    Quantity:  28.35 g  Refills:  3     losartan 100 MG Tabs  Commonly known as:  COZAAR      TAKE 1 TABLET(100 MG) BY MOUTH DAILY   Quantity:  90 tablet  Refills:  0     metFORMIN HCl 500 MG Tabs  Commonly known as:  GLUCOPHAGE      TAKE 1 TABL nondistended. Positive bowel sounds. No rebound or guarding. Neurologic: No focal neurological deficits. Musculoskeletal: Moves all extremities. Extremities: edema.   -------------------------------------------------------------------------------------

## 2019-04-06 NOTE — PLAN OF CARE
Feeling better today,fatigued but no wheezing noted. Remains on RA,O2 sats on the high 90s. Ambulating to the bathroom with staff assistance. Voiding without any difficulty,frequently due dose of IV lasix given. Remains on IV antibiotics with no adverse reacti

## 2019-04-06 NOTE — PROGRESS NOTES
No wheezing noted, maintained on room air. Son concerned about constipation. Also says she has been \"dragging left foot\" for past three weeks, also has some left knee pain. No obvious injury noted able to move both legs, denies numbness and tingling.  Jeff

## 2019-04-06 NOTE — PLAN OF CARE
Patient was having expiratory wheezing, and DR Ornelas notified ,and patient received a dose of iv lasix, and obtained an order for a duoneb.

## 2019-04-06 NOTE — PROGRESS NOTES
Heme/Onc Progress Note      Subjective  Patient with [facility] [address] admitted with fever. She states she feels well. She has been eating. She currently has no pain. She has been walking to at least the bathroom. She denies shortness of breath.     Physical Examinat 34.0 pg    MCHC 32.4 31.0 - 37.0 g/dL    RDW 20.9 (H) 11.0 - 15.0 %    RDW-SD 63.4 (H) 35.1 - 46.3 fL    Neutrophil Absolute Prelim 3.77 1.50 - 7.70 x10 (3) uL    Neutrophil Absolute 3.77 1.50 - 7.70 x10(3) uL    Lymphocyte Absolute 0.36 (L) 1.00 - 4.00 x1

## 2019-04-08 ENCOUNTER — TELEPHONE (OUTPATIENT)
Dept: INTERNAL MEDICINE CLINIC | Facility: CLINIC | Age: 79
End: 2019-04-08

## 2019-04-08 NOTE — TELEPHONE ENCOUNTER
[place] [address] Coordinator ([phone]), calling to inform Dr that pt was hospitalized from 4/3/19/4/6/19 and is doing better.     Pt also has an appointment today with the Oncologist.

## 2019-04-10 ENCOUNTER — TELEPHONE (OUTPATIENT)
Dept: INTERNAL MEDICINE CLINIC | Facility: CLINIC | Age: 79
End: 2019-04-10

## 2019-04-10 RX ORDER — LEVOFLOXACIN 500 MG/1
500 TABLET, FILM COATED ORAL DAILY
Qty: 7 TABLET | Refills: 0 | Status: SHIPPED | OUTPATIENT
Start: 2019-04-10 | End: 2019-04-17

## 2019-04-10 NOTE — TELEPHONE ENCOUNTER
Candice, physical therapist, called from patient's home to notify that patient has fever and elevated blood pressure. Requesting further recommendation from nurse.  Please advise    # [phone]

## 2019-04-10 NOTE — TELEPHONE ENCOUNTER
lmtcb  See additional telephone encounter from today. Spoke with physical therapist and pt's daughter.

## 2019-04-10 NOTE — TELEPHONE ENCOUNTER
Candice reported b/p recheck of 170/76. Temp 99.0  All instructions given to pt's daughter. Daughter verbalizes understanding. If another round of  antibiotics please send to  [employer].

## 2019-04-10 NOTE — TELEPHONE ENCOUNTER
Patient Son calling in, regarding his Mother being released from the [place] [address] she still has a fever and they are not sure what to do. Please call pt Son to discuss symptoms.

## 2019-04-10 NOTE — TELEPHONE ENCOUNTER
If repeat blood pressure is above 335 systolic, they should give additional dose of amlodipine 5 mg tablet. We can watch fevers for now, continue with antibiotics (she was prescribed levofloxacin by hospital).   I believe the antibiotics were supposed

## 2019-04-10 NOTE — TELEPHONE ENCOUNTER
Candice from [facility] currently at pt's home for PT eval.  B/p 182/80 and temp 100.4 about 10 min ago. Pt took b/p med just a few minutes ago. B/p will be rechecked and reported to Dr Solis De Jesus  before PT leaves the home. Please advise.

## 2019-04-12 ENCOUNTER — TELEPHONE (OUTPATIENT)
Dept: INTERNAL MEDICINE CLINIC | Facility: CLINIC | Age: 79
End: 2019-04-12

## 2019-04-12 NOTE — TELEPHONE ENCOUNTER
To notify doctor of reschedule this week of OT visit change per daughter of patient's request.  Shannon Dye only as needed

## 2019-04-15 ENCOUNTER — TELEPHONE (OUTPATIENT)
Dept: INTERNAL MEDICINE CLINIC | Facility: CLINIC | Age: 79
End: 2019-04-15

## 2019-04-15 NOTE — TELEPHONE ENCOUNTER
Spoke with Jose Stone from [facility].  Verbal order given to move OT visit from last week to this week Wednesday.

## 2019-04-15 NOTE — TELEPHONE ENCOUNTER
Ernestina Matrin from [place] [address] is calling to let Dr. Osiel Walls know that the patient is very weak today and was having a hard time standing up. He also stated that she looks like she has fluid build up in her abdomen.  He would like to speak with a nurs

## 2019-04-15 NOTE — TELEPHONE ENCOUNTER
Dodson Sandifer from [facility] called to report he is currently in pt's home and pt with abdominal ascites. Pt having difficulty sitting d/t distension and appears much weaker today then at visit last week. No c/o sob, nausea, vomiting. Normal BM today.   B/p

## 2019-04-15 NOTE — TELEPHONE ENCOUNTER
Regis Villarreal from [place] [address] called and stated that she needs to speak with a nurse directly in regards to the order for OT. Please call her back at [phone].

## 2019-04-19 ENCOUNTER — TELEPHONE (OUTPATIENT)
Dept: INTERNAL MEDICINE CLINIC | Facility: CLINIC | Age: 79
End: 2019-04-19

## 2019-04-19 DIAGNOSIS — I82.220 IVC THROMBOSIS (HCC): ICD-10-CM

## 2019-04-19 DIAGNOSIS — R14.0 ABDOMINAL DISTENSION: ICD-10-CM

## 2019-04-19 DIAGNOSIS — R06.00 DYSPNEA ON EXERTION: ICD-10-CM

## 2019-04-19 DIAGNOSIS — C22.0 HEPATOCELLULAR CARCINOMA (HCC): Primary | ICD-10-CM

## 2019-04-19 DIAGNOSIS — B19.20 HEPATITIS C VIRUS INFECTION WITHOUT HEPATIC COMA, UNSPECIFIED CHRONICITY: ICD-10-CM

## 2019-04-19 DIAGNOSIS — M19.90 CHRONIC OSTEOARTHRITIS: Chronic | ICD-10-CM

## 2019-04-19 DIAGNOSIS — K59.00 CONSTIPATION, UNSPECIFIED CONSTIPATION TYPE: ICD-10-CM

## 2019-04-19 NOTE — TELEPHONE ENCOUNTER
Patient's  calling patient released from [facility] a day ago and she is having trouble breathing ongoing; he thinks they need order for Oxygen for her?   It was difficult understanding him and advised I would have a nurse callback asap to triage

## 2019-04-19 NOTE — TELEPHONE ENCOUNTER
Unfortunately I do not see any documentation in the [facility] notes about an oxygen walk test or low oxygen levels.   I can try and write an order for intermittent oxygen, but she may have to come in for an office visit for documentation of O2 saturations oneal

## 2019-04-19 NOTE — TELEPHONE ENCOUNTER
Pt admitted on [facility] due to dx of liver caner. Pt was recommended by physician at hospital to request o2 tank from pcp to be used prn. Pt currently asymptomatic.      Pt's last ov was on 2/18/19 w Dr Kasey Masters, would you like to see pt for a hosp

## 2019-04-22 NOTE — TELEPHONE ENCOUNTER
Home health advised to place a dme. Pt's  informed and pt's  requested an order for hospital bed as well. Pt's  has schedule an appointment w Dr Isis Alcazar to document medical need for hospital bed and o2 tank.     Referral pending, jonas

## 2019-04-24 ENCOUNTER — OFFICE VISIT (OUTPATIENT)
Dept: INTERNAL MEDICINE CLINIC | Facility: CLINIC | Age: 79
End: 2019-04-24
Payer: MEDICAID

## 2019-04-24 VITALS
RESPIRATION RATE: 12 BRPM | TEMPERATURE: 98 F | DIASTOLIC BLOOD PRESSURE: 54 MMHG | BODY MASS INDEX: 22 KG/M2 | OXYGEN SATURATION: 96 % | HEART RATE: 64 BPM | WEIGHT: 126.5 LBS | SYSTOLIC BLOOD PRESSURE: 126 MMHG

## 2019-04-24 DIAGNOSIS — K59.09 OTHER CONSTIPATION: ICD-10-CM

## 2019-04-24 DIAGNOSIS — D61.818 PANCYTOPENIA (HCC): ICD-10-CM

## 2019-04-24 DIAGNOSIS — I82.220 IVC THROMBOSIS (HCC): ICD-10-CM

## 2019-04-24 DIAGNOSIS — B19.20 HEPATITIS C VIRUS INFECTION WITHOUT HEPATIC COMA, UNSPECIFIED CHRONICITY: Chronic | ICD-10-CM

## 2019-04-24 DIAGNOSIS — K74.69 OTHER CIRRHOSIS OF LIVER (HCC): Chronic | ICD-10-CM

## 2019-04-24 DIAGNOSIS — E87.1 HYPONATREMIA: ICD-10-CM

## 2019-04-24 DIAGNOSIS — I10 ESSENTIAL HYPERTENSION: Chronic | ICD-10-CM

## 2019-04-24 DIAGNOSIS — E44.0 MODERATE PROTEIN-CALORIE MALNUTRITION (HCC): ICD-10-CM

## 2019-04-24 DIAGNOSIS — C22.0 HEPATOCELLULAR CARCINOMA (HCC): Primary | Chronic | ICD-10-CM

## 2019-04-24 DIAGNOSIS — E11.9 CONTROLLED TYPE 2 DIABETES MELLITUS WITHOUT COMPLICATION, WITHOUT LONG-TERM CURRENT USE OF INSULIN (HCC): ICD-10-CM

## 2019-04-24 PROBLEM — N39.0 URINARY TRACT INFECTION WITHOUT HEMATURIA, SITE UNSPECIFIED: Status: RESOLVED | Noted: 2019-04-04 | Resolved: 2019-04-24

## 2019-04-24 PROBLEM — I77.9 PERIPHERAL ARTERIAL OCCLUSIVE DISEASE (HCC): Chronic | Status: ACTIVE | Noted: 2018-02-12

## 2019-04-24 PROCEDURE — 1111F DSCHRG MED/CURRENT MED MERGE: CPT | Performed by: INTERNAL MEDICINE

## 2019-04-24 PROCEDURE — 99214 OFFICE O/P EST MOD 30 MIN: CPT | Performed by: INTERNAL MEDICINE

## 2019-04-24 RX ORDER — POTASSIUM CHLORIDE 1.5 G/1.77G
20 POWDER, FOR SOLUTION ORAL DAILY
COMMUNITY
Start: 2019-04-23 | End: 2019-04-24 | Stop reason: ALTCHOICE

## 2019-04-24 RX ORDER — SUCRALFATE ORAL 1 G/10ML
1 SUSPENSION ORAL
Qty: 420 ML | Refills: 1 | Status: SHIPPED | OUTPATIENT
Start: 2019-04-24

## 2019-04-24 RX ORDER — FUROSEMIDE 20 MG/1
20 TABLET ORAL DAILY
Qty: 90 TABLET | Refills: 1 | Status: SHIPPED | OUTPATIENT
Start: 2019-04-24

## 2019-04-24 RX ORDER — METHYLPREDNISOLONE 4 MG/1
TABLET ORAL
Qty: 1 KIT | Refills: 0 | Status: SHIPPED | OUTPATIENT
Start: 2019-04-24 | End: 2019-06-07 | Stop reason: ALTCHOICE

## 2019-04-24 RX ORDER — OMEPRAZOLE 40 MG/1
1 CAPSULE, DELAYED RELEASE ORAL DAILY
Refills: 6 | COMMUNITY
Start: 2019-03-01 | End: 2019-04-24

## 2019-04-24 RX ORDER — OMEPRAZOLE 40 MG/1
40 CAPSULE, DELAYED RELEASE ORAL DAILY
Qty: 90 CAPSULE | Refills: 1 | Status: SHIPPED | OUTPATIENT
Start: 2019-04-24 | End: 2019-06-07

## 2019-04-24 NOTE — PATIENT INSTRUCTIONS
- Follow up with Dr. Kalli Ho as scheduled (first week of May). Discuss with him about when/if you need to follow up with the other [address] [place] [address] specialists (Dr. Sugey Deshpande, who is the liver specialist, and Dr. Linda Hernandez, the GI specialist).   - See how she does on

## 2019-04-24 NOTE — PROGRESS NOTES
Dakotah Velasquez is a 67year old female. HPI:   Patient presents with:  Hospital F/U: 4-16-19   Patient presents for follow up on several issues. Here with daughter and daughter-in-law. She was recently hospitalized at [facility].   Presented there on 4/1 Controlled DM II - reasonable glucose readings.     Past medical, family, surgical and social history were reviewed as listed in the chart, and are unchanged from previous visit on 2/18/2019  REVIEW OF SYSTEMS:   GENERAL/ const: no fevers/chills, no unint TABLET(500 MG) BY MOUTH DAILY WITH BREAKFAST, Disp: 90 tablet, Rfl: 0  •  AMLODIPINE BESYLATE 5 MG Oral Tab, TAKE 1 TABLET(5 MG) BY MOUTH DAILY, Disp: 90 tablet, Rfl: 0  •  hydrocortisone 2.5 % Rectal Cream, Place 1 Application rectally 2 (two) times daily without murmurs. No clubbing, cyanosis or edema. GI: tense, mildly distended, mild tenderness to palpation. MS: decreased ROM of legs due to pain, weakness. In wheelchair. PSYCH: pleasant, appropriate mood and affect  ASSESSMENT AND PLAN:   1.  Hepatoc Reynold, she was given amlodipine 5 mg qd, furosemide 20 mg qd, clonidine 0.1 mg BID, losartan 100 mg qd. Continue for now. Will hold previous other medications (carvedilol 25 mg BID, hydralazine 100 mg BID).      10. Controlled type 2 diabetes mellitus wi

## 2019-04-26 PROBLEM — M19.90 CHRONIC OSTEOARTHRITIS: Chronic | Status: ACTIVE | Noted: 2017-05-12

## 2019-04-26 NOTE — TELEPHONE ENCOUNTER
Faxed DME orders for Portable Gaseous Oxygen System to Nasreen Byrd F#[phone]    Faxed DME orders for Hospital bed semi electric without rail without mattress to [address] [place] [address] F#[phone]    Also faxed insurance information and face

## 2019-04-26 NOTE — TELEPHONE ENCOUNTER
Referrals signed, please fax to [address] and [facility] with copy of my most recent progress note.

## 2019-04-29 NOTE — TELEPHONE ENCOUNTER
Received order from [facility] for Hospital Bed - placed in your inbasket for signature.

## 2019-04-30 NOTE — TELEPHONE ENCOUNTER
Received the Respiratory Order Form from [employer] and placed in your inbasket. Requesting LPM on form.

## 2019-05-01 NOTE — TELEPHONE ENCOUNTER
Form filled out - but I am not sure she will qualify for O2 as there are no documented desaturations.   May need [facility] to do a home oxygen test.

## 2019-05-03 RX ORDER — FUROSEMIDE 20 MG/1
TABLET ORAL
Qty: 60 TABLET | Refills: 0 | OUTPATIENT
Start: 2019-05-03

## 2019-05-03 NOTE — TELEPHONE ENCOUNTER
90 day prescription (20 mg once daily) sent in with 1 refill at last visit. She should not need another refill for six months.

## 2019-05-03 NOTE — TELEPHONE ENCOUNTER
Last OV: 4/24/19 with Dr. Kasey Masters  Last refill date: 4/24/19     #/refills: #90, 1 refill     [id number]  ([place], [place])  FUROSEMIDE 20MG: Take 1 tablet by mouth DAILY.   When pt was asked to return for OV: 1-2 months   Upcoming appt

## 2019-05-06 ENCOUNTER — TELEPHONE (OUTPATIENT)
Dept: INTERNAL MEDICINE CLINIC | Facility: CLINIC | Age: 79
End: 2019-05-06

## 2019-05-07 NOTE — TELEPHONE ENCOUNTER
[facility] Orders dated 5/3/19 faxed to [phone]. Original to scan. Copy in blue accordion in MG/RP pod.

## 2019-05-13 ENCOUNTER — TELEPHONE (OUTPATIENT)
Dept: INTERNAL MEDICINE CLINIC | Facility: CLINIC | Age: 79
End: 2019-05-13

## 2019-05-13 NOTE — TELEPHONE ENCOUNTER
Candice PT from [facility], called requesting extension on patient's physical therapy. Requesting one visit x 4 weeks.      #[phone]

## 2019-05-21 ENCOUNTER — TELEPHONE (OUTPATIENT)
Dept: INTERNAL MEDICINE CLINIC | Facility: CLINIC | Age: 79
End: 2019-05-21

## 2019-05-29 ENCOUNTER — TELEPHONE (OUTPATIENT)
Dept: INTERNAL MEDICINE CLINIC | Facility: CLINIC | Age: 79
End: 2019-05-29

## 2019-05-29 NOTE — TELEPHONE ENCOUNTER
Barb Herrmann from [facility] is calling to let Dr. Sunni Horne know that he is going to recertify the patient for home health services on 05/31/19. Please advise.

## 2019-05-30 RX ORDER — AMLODIPINE BESYLATE 5 MG/1
TABLET ORAL
Qty: 90 TABLET | Refills: 0 | Status: SHIPPED | OUTPATIENT
Start: 2019-05-30

## 2019-05-30 NOTE — TELEPHONE ENCOUNTER
Passed protocol     Last refill:  3/1/2019 Amlodipine 5 mg #90 NR  3/1/2019 Metformin 500 mg #90 NR    Last A1C - 1/24/2019     LOV:   4/24/2019 Dr Fredy Roche RTC 1-2 months   9. Essential hypertension  At goal blood pressure.   At [facility], she was given amlodi

## 2019-06-03 ENCOUNTER — TELEPHONE (OUTPATIENT)
Dept: INTERNAL MEDICINE CLINIC | Facility: CLINIC | Age: 79
End: 2019-06-03

## 2019-06-03 NOTE — TELEPHONE ENCOUNTER
Brad Lopez from [facility] called to notify MD that OT was supposed to be done last week, but were unable to complete due to the holiday. OT will be moved to this week. Please call with any questions.   # [phone]

## 2019-06-06 NOTE — TELEPHONE ENCOUNTER
Mariano School from [facility] calling in. Wanting to report a fall that the patient had this morning, according to the pt's Daughters report. OT safety: [facility] placed a commode over the toilet. Pt reports a pain of 1/10 on the left mid section/trunk.  The daughter

## 2019-06-07 ENCOUNTER — OFFICE VISIT (OUTPATIENT)
Dept: INTERNAL MEDICINE CLINIC | Facility: CLINIC | Age: 79
End: 2019-06-07
Payer: MEDICAID

## 2019-06-07 ENCOUNTER — TELEPHONE (OUTPATIENT)
Dept: INTERNAL MEDICINE CLINIC | Facility: CLINIC | Age: 79
End: 2019-06-07

## 2019-06-07 ENCOUNTER — HOSPITAL ENCOUNTER (OUTPATIENT)
Dept: GENERAL RADIOLOGY | Age: 79
Discharge: HOME | End: 2019-06-07
Attending: INTERNAL MEDICINE
Payer: MEDICAID

## 2019-06-07 VITALS
RESPIRATION RATE: 12 BRPM | TEMPERATURE: 99 F | SYSTOLIC BLOOD PRESSURE: 132 MMHG | BODY MASS INDEX: 20 KG/M2 | DIASTOLIC BLOOD PRESSURE: 60 MMHG | WEIGHT: 117 LBS | HEART RATE: 80 BPM

## 2019-06-07 DIAGNOSIS — Z91.81 STATUS POST FALL: ICD-10-CM

## 2019-06-07 DIAGNOSIS — R11.2 NAUSEA AND VOMITING, INTRACTABILITY OF VOMITING NOT SPECIFIED, UNSPECIFIED VOMITING TYPE: ICD-10-CM

## 2019-06-07 DIAGNOSIS — C22.0 HEPATOCELLULAR CARCINOMA (HCC): ICD-10-CM

## 2019-06-07 DIAGNOSIS — R10.84 GENERALIZED ABDOMINAL PAIN: ICD-10-CM

## 2019-06-07 DIAGNOSIS — R07.89 LEFT-SIDED CHEST WALL PAIN: ICD-10-CM

## 2019-06-07 DIAGNOSIS — R07.89 LEFT-SIDED CHEST WALL PAIN: Primary | ICD-10-CM

## 2019-06-07 PROCEDURE — 99214 OFFICE O/P EST MOD 30 MIN: CPT | Performed by: INTERNAL MEDICINE

## 2019-06-07 PROCEDURE — 71101 X-RAY EXAM UNILAT RIBS/CHEST: CPT | Performed by: INTERNAL MEDICINE

## 2019-06-07 RX ORDER — ONDANSETRON 4 MG/1
4 TABLET, ORALLY DISINTEGRATING ORAL EVERY 8 HOURS PRN
Qty: 90 TABLET | Refills: 5 | Status: SHIPPED | OUTPATIENT
Start: 2019-06-07

## 2019-06-07 RX ORDER — PANTOPRAZOLE SODIUM 40 MG/1
40 TABLET, DELAYED RELEASE ORAL
Qty: 60 TABLET | Refills: 5 | Status: SHIPPED | OUTPATIENT
Start: 2019-06-07

## 2019-06-07 RX ORDER — TRAMADOL HYDROCHLORIDE 50 MG/1
50 TABLET ORAL EVERY 8 HOURS PRN
Qty: 60 TABLET | Refills: 3 | Status: SHIPPED | OUTPATIENT
Start: 2019-06-07

## 2019-06-07 NOTE — PROGRESS NOTES
Maury Elaine is a 67year old female. HPI:   Patient presents with:  Pain    Patient presents with several symptoms. Here with family members. She had a fall yesterday.   Fell off commode, on to the left side of the chest.  Felt left-sided chest wall BY MOUTH DAILY, Disp: 90 tablet, Rfl: 0  •  furosemide 20 MG Oral Tab, Take 1 tablet (20 mg total) by mouth daily. , Disp: 90 tablet, Rfl: 1  •  Glucose Blood (ONETOUCH ULTRA BLUE) In Vitro Strip, Tests 1 x daily, Disp: 100 strip, Rfl: 3  •  Jarome Nest total abdom hysterectomy. Family: family history includes Stroke in her brother. Social:  reports that she has never smoked. She has never used smokeless tobacco. She reports that she does not drink alcohol or use drugs.   Wt Readings from Last 6 Encounte tablet (4 mg total) by mouth every 8 (eight) hours as needed for Nausea. Dispense: 90 tablet; Refill: 5  - traMADol HCl 50 MG Oral Tab; Take 1 tablet (50 mg total) by mouth every 8 (eight) hours as needed for Pain. Dispense: 60 tablet;  Refill: 3    Patie

## 2019-06-07 NOTE — TELEPHONE ENCOUNTER
Pt fell on \"wall\" yesterday after using toilet and injured R side of ribs. Has slight redness. No relief with tylenol. Did not hit head, lose consciousness, c/o being light headed/dizzy, SOB, bruising, open abrasion.      Future Appointments   Date

## 2019-06-07 NOTE — PATIENT INSTRUCTIONS
- We will check x-ray of your ribs and lungs to make sure there are no broken bones or lung problems.   - Start tramadol for pain. Take 1 tablet every 8 hours as needed. - Ondansetron refilled for nausea. - Pantoprazole refilled as well.    - Ok if blood

## 2019-06-07 NOTE — TELEPHONE ENCOUNTER
Daughter called to get an apt with Dr. Osiel Walls- Mom is weak  (no available appointments) and  fell yesterday and is complaining of slight pain on the right side.

## 2019-06-07 NOTE — TELEPHONE ENCOUNTER
Rachelle Thurston called to make sure RP reviewed notes for pts recent fall. Informed Rachelle Thurston that according to TE, RP did note that he reviewed TE and did not provide recommendations.     Clarified with Rachelle Thurston that pt did not hit head during fall, which Rachelle Thurston

## 2019-06-18 ENCOUNTER — TELEPHONE (OUTPATIENT)
Dept: INTERNAL MEDICINE CLINIC | Facility: CLINIC | Age: 79
End: 2019-06-18

## 2019-06-18 NOTE — TELEPHONE ENCOUNTER
[facility] care coordinator     Inform RP patient is admitted as of 6/15/19 to [facility] d/t chest pain and head inj following fall     Will call with additional information as available

## 2019-07-05 RX ORDER — CLONIDINE HYDROCHLORIDE 0.1 MG/1
TABLET ORAL
Qty: 180 TABLET | Refills: 0 | Status: SHIPPED | OUTPATIENT
Start: 2019-07-05

## 2019-07-05 RX ORDER — LOSARTAN POTASSIUM 100 MG/1
TABLET ORAL
Qty: 90 TABLET | Refills: 0 | Status: SHIPPED | OUTPATIENT
Start: 2019-07-05

## 2019-07-05 RX ORDER — CARVEDILOL 25 MG/1
TABLET ORAL
Qty: 180 TABLET | Refills: 0 | Status: SHIPPED | OUTPATIENT
Start: 2019-07-05

## 2019-07-05 RX ORDER — HYDRALAZINE HYDROCHLORIDE 100 MG/1
TABLET, FILM COATED ORAL
Qty: 180 TABLET | Refills: 0 | Status: SHIPPED | OUTPATIENT
Start: 2019-07-05

## 2019-07-05 NOTE — TELEPHONE ENCOUNTER
Passed protocol     Last refill:  4/2/2019 Clonidine . 1 mg #180 NR + Losartan 100 mg #90 NR   4/1/2019 Hydralazine 100 mg #180 NR + Carvedilol 25 mg #180 NR    LOV:   6/7/2019 Dr Montes De Oca  RTC 2-3 months     No FOV scheduled

## 2019-07-08 ENCOUNTER — TELEPHONE (OUTPATIENT)
Dept: INTERNAL MEDICINE CLINIC | Facility: CLINIC | Age: 79
End: 2019-07-08

## 2019-07-09 NOTE — TELEPHONE ENCOUNTER
Received a request from [facility] [id number] for a CMN on a manual wheechair request and chart notes request.   Stacia Alvarez in your inbasket for completions.

## 2019-07-11 ENCOUNTER — TELEPHONE (OUTPATIENT)
Dept: INTERNAL MEDICINE CLINIC | Facility: CLINIC | Age: 79
End: 2019-07-11

## 2019-07-11 NOTE — TELEPHONE ENCOUNTER
Form filled out, note addended and printed out. We will need to get patient's hip width measurement from patient/family.

## 2019-07-11 NOTE — TELEPHONE ENCOUNTER
Called nurse from [facility] and nurse informed prescription was written by Dr Ulysses Lat from oncology.      Oxycodone 5mg bid prn  Zofran 8mg q8h prn

## 2019-07-11 NOTE — TELEPHONE ENCOUNTER
Requesting a verbal order to agree from Dr Ingrid Jernigan to take other orders from another doctor there for medications Oxycodone and Zofran

## 2019-07-26 ENCOUNTER — TELEPHONE (OUTPATIENT)
Dept: INTERNAL MEDICINE CLINIC | Facility: CLINIC | Age: 79
End: 2019-07-26

## 2019-07-26 NOTE — TELEPHONE ENCOUNTER
Candice boswell PT From [facility] would like a call back needs verbal order for pt to be seen next week

## 2019-07-26 NOTE — TELEPHONE ENCOUNTER
Latanya Sofia from [facility] is calling to let Dr. Khalif Navarro know that he will be re certifying the patient for home health services the week of 08/14/19. Please advise.

## 2019-07-29 NOTE — TELEPHONE ENCOUNTER
Ok to give verbal orders as requested (not sure if this is different from other phone call/if they need multiple verbal orders but if they do that is fine).

## 2019-08-06 ENCOUNTER — TELEPHONE (OUTPATIENT)
Dept: INTERNAL MEDICINE CLINIC | Facility: CLINIC | Age: 79
End: 2019-08-06

## 2019-08-06 DIAGNOSIS — R05.8 PRODUCTIVE COUGH: Primary | ICD-10-CM

## 2019-08-06 DIAGNOSIS — R09.89 LUNG CRACKLES: ICD-10-CM

## 2019-08-06 NOTE — TELEPHONE ENCOUNTER
[facility]. Call placed to pt's daughter.  Informed CXR ordered and appointment scheduled on 8/8 at 10 am.

## 2019-08-06 NOTE — TELEPHONE ENCOUNTER
Fern Silverio from [facility] called to report pt with crackles LLL with cough. No sob, fever or chills although pt fatigued for the past week. 02 sat 96% and temp 97.8. B/p wnl.   Can Dr Domnick Sacks squeeze her in tomorrow or order a cxr?

## 2019-08-06 NOTE — TELEPHONE ENCOUNTER
Looking to speak to nurse, states pts lungs sound crackling and shes coughing and he would like her to get an xray or come in to see Dr Josephine Fernandez

## 2019-08-07 ENCOUNTER — HOSPITAL ENCOUNTER (OUTPATIENT)
Dept: GENERAL RADIOLOGY | Age: 79
Discharge: HOME | End: 2019-08-07
Attending: INTERNAL MEDICINE
Payer: MEDICAID

## 2019-08-07 DIAGNOSIS — R05.8 PRODUCTIVE COUGH: ICD-10-CM

## 2019-08-07 DIAGNOSIS — R09.89 LUNG CRACKLES: ICD-10-CM

## 2019-08-07 PROCEDURE — 71046 X-RAY EXAM CHEST 2 VIEWS: CPT | Performed by: INTERNAL MEDICINE

## 2019-08-09 ENCOUNTER — OFFICE VISIT (OUTPATIENT)
Dept: INTERNAL MEDICINE CLINIC | Facility: CLINIC | Age: 79
End: 2019-08-09
Payer: MEDICAID

## 2019-08-09 ENCOUNTER — TELEPHONE (OUTPATIENT)
Dept: INTERNAL MEDICINE CLINIC | Facility: CLINIC | Age: 79
End: 2019-08-09

## 2019-08-09 VITALS
WEIGHT: 118.25 LBS | DIASTOLIC BLOOD PRESSURE: 60 MMHG | HEART RATE: 76 BPM | SYSTOLIC BLOOD PRESSURE: 134 MMHG | OXYGEN SATURATION: 99 % | TEMPERATURE: 98 F | RESPIRATION RATE: 12 BRPM | BODY MASS INDEX: 20 KG/M2

## 2019-08-09 DIAGNOSIS — E11.9 CONTROLLED TYPE 2 DIABETES MELLITUS WITHOUT COMPLICATION, WITHOUT LONG-TERM CURRENT USE OF INSULIN (HCC): Chronic | ICD-10-CM

## 2019-08-09 DIAGNOSIS — R05.3 PERSISTENT COUGH: Primary | ICD-10-CM

## 2019-08-09 DIAGNOSIS — K74.69 OTHER CIRRHOSIS OF LIVER (HCC): Chronic | ICD-10-CM

## 2019-08-09 DIAGNOSIS — E87.1 HYPONATREMIA: ICD-10-CM

## 2019-08-09 DIAGNOSIS — B18.2 CHRONIC HEPATITIS C WITHOUT HEPATIC COMA (HCC): Chronic | ICD-10-CM

## 2019-08-09 DIAGNOSIS — I10 ESSENTIAL HYPERTENSION: Chronic | ICD-10-CM

## 2019-08-09 DIAGNOSIS — C22.0 HEPATOCELLULAR CARCINOMA (HCC): Chronic | ICD-10-CM

## 2019-08-09 DIAGNOSIS — I82.220 IVC THROMBOSIS (HCC): ICD-10-CM

## 2019-08-09 PROCEDURE — 99214 OFFICE O/P EST MOD 30 MIN: CPT | Performed by: INTERNAL MEDICINE

## 2019-08-09 NOTE — PROGRESS NOTES
Jose Rich is a 67year old female. HPI:   Patient presents with:  Cough    Patient presents with complaint of persistent cough. Going on for a couple of weeks now. Occasionally productive. She had a chest x-ray done yesterday.   In the past few m MG) BY MOUTH TWICE DAILY WITH MEALS, Disp: 180 tablet, Rfl: 0  •  HYDRALAZINE  MG Oral Tab, TAKE 1 TABLET(100 MG) BY MOUTH TWICE DAILY, Disp: 180 tablet, Rfl: 0  •  LOSARTAN 100 MG Oral Tab, TAKE 1 TABLET(100 MG) BY MOUTH DAILY, Disp: 90 tablet, Rfl mouth daily. , Disp: 1 Bottle, Rfl: 6  •  cetirizine 10 MG Oral Tab, Take 10 mg by mouth daily. , Disp: , Rfl:   Medical:  has a past medical history of Anxiety state, Cataract, Pneumonia due to infectious organism, and Urinary tract infection without hematu prescribed by Reynold (Nexavar). Started on Eliquis for anticoagulation. Started on spironolactone for edema. Hep C treatment not indicated per GI. Advised family to discuss with Oncology to see if further GI follow up is warranted.       6. Essential hy

## 2019-08-09 NOTE — PATIENT INSTRUCTIONS
- Chest x-ray showed a small amount of fluid in the lung. Not enough to get drained. - It did show some chronic inflammation. This may be causing the cough. - We will start a daily inhaler (Symbicort) to help with cough, shortness of breath.   Use 1 puf

## 2019-08-11 RX ORDER — APIXABAN 2.5 MG/1
2.5 TABLET, FILM COATED ORAL 2 TIMES DAILY
Refills: 0 | COMMUNITY
Start: 2019-08-05

## 2019-08-11 RX ORDER — SPIRONOLACTONE 25 MG/1
25 TABLET ORAL 2 TIMES DAILY
Refills: 1 | COMMUNITY
Start: 2019-08-06

## 2019-08-13 ENCOUNTER — TELEPHONE (OUTPATIENT)
Dept: INTERNAL MEDICINE CLINIC | Facility: CLINIC | Age: 79
End: 2019-08-13

## 2019-08-13 NOTE — TELEPHONE ENCOUNTER
Spoke with physical therapist Alvenia Falls from [facility].  Pt more short of breath with activity today compared to when    Alvenia Falls last seen pt about 3 weeks ago. 02 sat 92% at rest and 90% with activity. Pt needed to rest more often.   Pt has oncology appoint

## 2019-08-13 NOTE — TELEPHONE ENCOUNTER
PT evaluation today patient more short of breath more than before. She sees oncology tomorrow will check meds then.   PT recommended for  1x a week  for 4 wks

## 2019-08-13 NOTE — TELEPHONE ENCOUNTER
Steve Tyler from [facility] called for verbal order to reschedule visit from last week to today or this Thursday. Verbal order given.

## 2019-08-13 NOTE — TELEPHONE ENCOUNTER
[facility] calling to let doctor know she was unable to reach/connect with patient last week for PT; [facility] asking if doctor would approve schedule change to this week?  Please callback

## 2019-09-11 DIAGNOSIS — E11.9 CONTROLLED TYPE 2 DIABETES MELLITUS WITHOUT COMPLICATION, WITHOUT LONG-TERM CURRENT USE OF INSULIN (HCC): Primary | Chronic | ICD-10-CM

## 2019-09-11 NOTE — TELEPHONE ENCOUNTER
Failed protocol     Last refill:  5/30/2019 Metformin 500 mg #90 NR    LOV:   8/9/2019 Dr Grover Barreto RTC 2-3 months    7. Controlled type 2 diabetes mellitus without complication, without long-term current use of insulin (HCC)  Reasonable control.   Continue m
13.18

## 2020-01-01 ENCOUNTER — MED REC SCAN ONLY (OUTPATIENT)
Dept: INTERNAL MEDICINE CLINIC | Facility: CLINIC | Age: 80
End: 2020-01-01

## 2022-10-10 NOTE — TELEPHONE ENCOUNTER
-- DO NOT REPLY / DO NOT REPLY ALL --  -- Message is from [facility] [employer] ([facility]) --    Offered Waitlist if Available for the Visit Type? No    Caller is requesting an appointment - at a sooner time than what was available.      Caller wants sooner appointment - offered other approved options    Reason for Visit:  Rectal bleeding     Is the patient currently scheduled? No    Preferred time to be seen:  asap     Caller Information       Type Contact Phone/Fax    10/10/2022 08:05 AM CDT Phone (Incoming) Abraham Menjivar (Self) [phone] (M)          Alternative phone number: none   Can a detailed message be left? Yes    Message Turnaround:     [place]:    Please give this turnaround time to the caller:   \"This message will be sent to [state Provider's name]. The clinical team will fulfill your request as soon as they review your message.\"   Left message for patient to give us a call back. Please help patient make an appointment. Then we can refill medication to the appointment date.       Medication: Gabapentin 100mg     Date of last refill: 4/12/18  [place]

## 2023-06-04 NOTE — H&P
Hospital Course/Event Note [facility]ITAL[facility]  History and Physical     Lashay Caraballo Patient Status:  Observation    1940 MRN [id number]   [facility] 4NW-A Attending Justa Kevin MD   Hosp Day # 0 PCP Carmen Hawk MD     Chief Complaint: weakness    Histo Endocrine/Event Note Event Note TABLET(500 MG) BY MOUTH DAILY WITH BREAKFAST Disp: 90 tablet Rfl: 0   AMLODIPINE BESYLATE 5 MG Oral Tab TAKE 1 TABLET(5 MG) BY MOUTH DAILY Disp: 90 tablet Rfl: 0   Enoxaparin Sodium 80 MG/0.8ML Subcutaneous Solution Inject 0.8 mL (80 mg total) into the ski (Oral)   Resp 20   Ht 5' 4\" (1.626 m)   Wt 125 lb 6.4 oz (56.9 kg)   SpO2 98%   BMI 21.52 kg/m²   General: No acute distress. Alert and oriented x 3. HEENT: Normocephalic atraumatic. Moist mucous membranes. EOM-I. PERRLA. Anicteric.   Neck: No lymphadenop Delfina Sanchez MD  4/4/2019

## (undated) NOTE — LETTER
10/05/18        Thomas Bonner  [address]      Dear Vicky Lemons records indicate that you have outstanding lab work and or testing that was ordered for you and has not yet been completed:  Orders Placed This Encounter

## (undated) NOTE — Clinical Note
Pt will be coming in for HFU appt on 2/6/19. Diagnosed with Hepatocellular Carcinoma & IVC thrombus. Thank you.

## (undated) NOTE — IP AVS SNAPSHOT
[facility] [place] [address] [place], [address] ~ [phone]                Discharge Summary   1/11/2017    Maury Elaine           Admission Information        Provider Department    1/11/2017 Kiana Meade MD  8ne-A         T Last time this was given:  100 mg on 1/13/2017  8:02 AM   Commonly known as:  APRESOLINE        Take 1 tablet (100 mg total) by mouth 2 (two) times daily with meals.     Kirsty Silver                              Losartan Potassium 100 MG Tabs   Last time t Follow up with Champ Law MD In 1 week.     Specialties:  Cardiovascular Diseases, CARDIOLOGY    Why:  or Nurse practitioner, call to arrange     Contact information:    [address] [address]  6 ALT Bilirubin,Total Total Protein Albumin Sodium Potassium Chloride    (01/11/17)  65 (H) (01/11/17)  0.7 (01/11/17)  8.8 (H) (01/11/17)  3.8 (01/12/17)  131 (L) (01/13/17)  4.5 (01/12/17)  98 (L)      Pending Labs     Order Current Status    ACTIN (OLIVER If you have questions, you can call [phone] to talk to our [facility] Staff. Remember, MyChart is NOT to be used for urgent needs.   For medical emergencies, dial 911.             _________________________________________________________________ generally include: diarrhea, constipation, headache, allergic reaction (itching, rash, hives)   What to report to your healthcare team: Pain, nausea/vomiting, no bowel movement in 2+ days, diarrhea           Anti-Histamines     cetirizine 10 MG Oral Tab

## (undated) NOTE — MR AVS SNAPSHOT
Emelia  [address] [facility]  [address] [facility] [address] [phone]               Thank you for choosing us for your health care visit with Maico Devi MD.  We are glad to serve you and happy to provide you with this s Commonly known as:  LASIX           HydrALAZINE HCl 100 MG Tabs   Take 1 tablet (100 mg total) by mouth 2 (two) times daily with meals.    Commonly known as:  APRESOLINE           losartan 100 MG Tabs   Take 1 tablet (100 mg total) by mouth daily before jacqueline [employer] [facility] [address] [place], [place] [address] for making healthy food choices  -   Enjoy your food, but eat less. Fully enjoy your food when eating. Don’t eat while distracted and slow down. Avoid over sized portions. Don’t eat while when you’re bored.